# Patient Record
Sex: FEMALE | Race: WHITE | HISPANIC OR LATINO | Employment: UNEMPLOYED | URBAN - METROPOLITAN AREA
[De-identification: names, ages, dates, MRNs, and addresses within clinical notes are randomized per-mention and may not be internally consistent; named-entity substitution may affect disease eponyms.]

---

## 2019-02-27 ENCOUNTER — LAB VISIT (OUTPATIENT)
Dept: LAB | Facility: HOSPITAL | Age: 29
End: 2019-02-27
Attending: OBSTETRICS & GYNECOLOGY
Payer: MEDICAID

## 2019-02-27 ENCOUNTER — INITIAL PRENATAL (OUTPATIENT)
Dept: OBSTETRICS AND GYNECOLOGY | Facility: CLINIC | Age: 29
End: 2019-02-27
Payer: MEDICAID

## 2019-02-27 VITALS — DIASTOLIC BLOOD PRESSURE: 72 MMHG | SYSTOLIC BLOOD PRESSURE: 110 MMHG | WEIGHT: 132.19 LBS

## 2019-02-27 DIAGNOSIS — Z3A.36 36 WEEKS GESTATION OF PREGNANCY: ICD-10-CM

## 2019-02-27 DIAGNOSIS — Z36.3 ANTENATAL SCREENING FOR MALFORMATION USING ULTRASONICS: ICD-10-CM

## 2019-02-27 DIAGNOSIS — Z34.90 PREGNANCY, UNSPECIFIED GESTATIONAL AGE: ICD-10-CM

## 2019-02-27 DIAGNOSIS — O09.33 INSUFFICIENT PRENATAL CARE, THIRD TRIMESTER: ICD-10-CM

## 2019-02-27 DIAGNOSIS — Z34.90 PREGNANCY, UNSPECIFIED GESTATIONAL AGE: Primary | ICD-10-CM

## 2019-02-27 LAB
ABO + RH BLD: NORMAL
ALBUMIN SERPL BCP-MCNC: 2.4 G/DL
ALP SERPL-CCNC: 304 U/L
ALT SERPL W/O P-5'-P-CCNC: 16 U/L
ANION GAP SERPL CALC-SCNC: 7 MMOL/L
AST SERPL-CCNC: 18 U/L
BILIRUB SERPL-MCNC: 0.4 MG/DL
BLD GP AB SCN CELLS X3 SERPL QL: NORMAL
BUN SERPL-MCNC: 8 MG/DL
CALCIUM SERPL-MCNC: 9.1 MG/DL
CHLORIDE SERPL-SCNC: 110 MMOL/L
CO2 SERPL-SCNC: 21 MMOL/L
CREAT SERPL-MCNC: 0.6 MG/DL
ERYTHROCYTE [DISTWIDTH] IN BLOOD BY AUTOMATED COUNT: 13.2 %
EST. GFR  (AFRICAN AMERICAN): >60 ML/MIN/1.73 M^2
EST. GFR  (NON AFRICAN AMERICAN): >60 ML/MIN/1.73 M^2
GLUCOSE SERPL-MCNC: 86 MG/DL
HCT VFR BLD AUTO: 35.1 %
HGB BLD-MCNC: 11 G/DL
HGB S BLD QL SOLY: NEGATIVE
LDH SERPL L TO P-CCNC: 217 U/L
MCH RBC QN AUTO: 27.1 PG
MCHC RBC AUTO-ENTMCNC: 31.3 G/DL
MCV RBC AUTO: 87 FL
PLATELET # BLD AUTO: 191 K/UL
PMV BLD AUTO: 11.5 FL
POTASSIUM SERPL-SCNC: 3.8 MMOL/L
PROT SERPL-MCNC: 6.8 G/DL
RBC # BLD AUTO: 4.06 M/UL
SODIUM SERPL-SCNC: 138 MMOL/L
TSH SERPL DL<=0.005 MIU/L-ACNC: 1.83 UIU/ML
WBC # BLD AUTO: 4.15 K/UL

## 2019-02-27 PROCEDURE — 87088 URINE BACTERIA CULTURE: CPT | Mod: 59

## 2019-02-27 PROCEDURE — 87077 CULTURE AEROBIC IDENTIFY: CPT

## 2019-02-27 PROCEDURE — 83615 LACTATE (LD) (LDH) ENZYME: CPT

## 2019-02-27 PROCEDURE — 87081 CULTURE SCREEN ONLY: CPT

## 2019-02-27 PROCEDURE — 85027 COMPLETE CBC AUTOMATED: CPT

## 2019-02-27 PROCEDURE — 86703 HIV-1/HIV-2 1 RESULT ANTBDY: CPT

## 2019-02-27 PROCEDURE — 87340 HEPATITIS B SURFACE AG IA: CPT

## 2019-02-27 PROCEDURE — 99999 PR PBB SHADOW E&M-NEW PATIENT-LVL III: CPT | Mod: PBBFAC,,, | Performed by: OBSTETRICS & GYNECOLOGY

## 2019-02-27 PROCEDURE — 84443 ASSAY THYROID STIM HORMONE: CPT

## 2019-02-27 PROCEDURE — 87491 CHLMYD TRACH DNA AMP PROBE: CPT

## 2019-02-27 PROCEDURE — 86850 RBC ANTIBODY SCREEN: CPT

## 2019-02-27 PROCEDURE — 85660 RBC SICKLE CELL TEST: CPT

## 2019-02-27 PROCEDURE — 86762 RUBELLA ANTIBODY: CPT

## 2019-02-27 PROCEDURE — 99204 OFFICE O/P NEW MOD 45 MIN: CPT | Mod: TH,S$PBB,, | Performed by: OBSTETRICS & GYNECOLOGY

## 2019-02-27 PROCEDURE — 80053 COMPREHEN METABOLIC PANEL: CPT

## 2019-02-27 PROCEDURE — 99999 PR PBB SHADOW E&M-NEW PATIENT-LVL III: ICD-10-PCS | Mod: PBBFAC,,, | Performed by: OBSTETRICS & GYNECOLOGY

## 2019-02-27 PROCEDURE — 86592 SYPHILIS TEST NON-TREP QUAL: CPT

## 2019-02-27 PROCEDURE — 84156 ASSAY OF PROTEIN URINE: CPT

## 2019-02-27 PROCEDURE — 99204 PR OFFICE/OUTPT VISIT, NEW, LEVL IV, 45-59 MIN: ICD-10-PCS | Mod: TH,S$PBB,, | Performed by: OBSTETRICS & GYNECOLOGY

## 2019-02-27 PROCEDURE — 36415 COLL VENOUS BLD VENIPUNCTURE: CPT

## 2019-02-27 PROCEDURE — 99203 OFFICE O/P NEW LOW 30 MIN: CPT | Mod: PBBFAC,TH,PO | Performed by: OBSTETRICS & GYNECOLOGY

## 2019-02-27 PROCEDURE — 87086 URINE CULTURE/COLONY COUNT: CPT

## 2019-02-27 PROCEDURE — 87186 SC STD MICRODIL/AGAR DIL: CPT

## 2019-02-27 NOTE — PROGRESS NOTES
OBSTETRIC HISTORY:   OB History      Para Term  AB Living    3 1 1   1 1    SAB TAB Ectopic Multiple Live Births    1       1           HPI:   28 y.o.  Patient's last menstrual period was 2018.   Patient is  here for pregnancy without any prenatal care. She is 36 weeks by dates and appears to be advanced trimester. Complaints of headaches but relieved with Tylenol.  Feeling vaginal pressure and tingling in hands and feet. She denies bladder, breast and bowel complaints.    ROS:  GENERAL: Denies weight gain or weight loss. Feeling well overall.   SKIN: Denies rash or lesions.   HEAD: Denies headache.   NODES: Denies enlarged lymph nodes.   CHEST: Denies shortness of breath.   ABDOMEN: No abdominal pain, constipation, diarrhea, nausea, vomiting or rectal bleeding.   URINARY: No frequency, dysuria, hematuria, or burning on urination.  REPRODUCTIVE: See HPI.   BREASTS: The patient denies pain, lumps, or nipple discharge.   HEMATOLOGIC: No easy bruisability.   MUSCULOSKELETAL: Denies joint pain or back pain.   NEUROLOGIC: Denies weakness.   PSYCHIATRIC: Denies depression, anxiety or mood swings.    PE:   /72   Wt 59.9 kg (132 lb 2.7 oz)   LMP 2018   APPEARANCE: Well nourished, well developed, in no acute distress.  ABDOMEN: Soft. No tenderness or masses. No hernias.  PELVIC:   VULVA: No lesions. Normal female genitalia.  URETHRAL MEATUS: Normal size and location, no lesions, no prolapse.  URETHRA: No masses, tenderness, prolapse or scarring.  VAGINA: Moist and well rugated, no discharge, no significant cystocele or rectocele.  CERVIX: No lesions and discharge. 1cm/thick  UTERUS: Normal size, regular shape, mobile, non-tender, bladder base nontender.  ADNEXA: No masses or tenderness.  EXTREMITIES: no edema DTR's 1+    ASSESSMENT:  Pregnancy  Late prenatal care  History of pre-eclampsia    PLAN:  RTO 1 week  US as soon as possible  Consents today  Labs today  Can't give us urine yet but  will try to obtain urine for culture and p/c ratio  Unsure about circ

## 2019-02-27 NOTE — PROGRESS NOTES
Complaining of vaginal pressure and lower abdominal pain.  Also complaining headaches daily in the afternoon.  Denies blurred vision or right upper quadrant pain.  And complaining of tingling in hands and fingers    Patient is unable to give urine specimen

## 2019-02-28 ENCOUNTER — PROCEDURE VISIT (OUTPATIENT)
Dept: OBSTETRICS AND GYNECOLOGY | Facility: CLINIC | Age: 29
End: 2019-02-28
Payer: MEDICAID

## 2019-02-28 DIAGNOSIS — Z36.3 ANTENATAL SCREENING FOR MALFORMATION USING ULTRASONICS: ICD-10-CM

## 2019-02-28 DIAGNOSIS — O09.33 INSUFFICIENT PRENATAL CARE, THIRD TRIMESTER: ICD-10-CM

## 2019-02-28 DIAGNOSIS — Z3A.36 36 WEEKS GESTATION OF PREGNANCY: ICD-10-CM

## 2019-02-28 LAB
CREAT UR-MCNC: 71 MG/DL
HBV SURFACE AG SERPL QL IA: NEGATIVE
HIV 1+2 AB+HIV1 P24 AG SERPL QL IA: NEGATIVE
PROT UR-MCNC: 15 MG/DL
PROT/CREAT UR: 0.21 MG/G{CREAT}
RPR SER QL: NORMAL
RUBV IGG SER-ACNC: 76.8 IU/ML
RUBV IGG SER-IMP: REACTIVE

## 2019-02-28 PROCEDURE — 76805 PR US, OB 14+WKS, TRANSABD, SINGLE GESTATION: ICD-10-PCS | Mod: 26,S$PBB,, | Performed by: OBSTETRICS & GYNECOLOGY

## 2019-02-28 PROCEDURE — 76805 OB US >/= 14 WKS SNGL FETUS: CPT | Mod: 26,S$PBB,, | Performed by: OBSTETRICS & GYNECOLOGY

## 2019-02-28 PROCEDURE — 76805 OB US >/= 14 WKS SNGL FETUS: CPT | Mod: PBBFAC,PO | Performed by: OBSTETRICS & GYNECOLOGY

## 2019-03-01 LAB
C TRACH DNA SPEC QL NAA+PROBE: NOT DETECTED
N GONORRHOEA DNA SPEC QL NAA+PROBE: NOT DETECTED

## 2019-03-02 LAB
BACTERIA SPEC AEROBE CULT: NORMAL
BACTERIA UR CULT: NORMAL

## 2019-03-08 ENCOUNTER — HOSPITAL ENCOUNTER (OUTPATIENT)
Facility: HOSPITAL | Age: 29
Discharge: HOME | End: 2019-03-08
Attending: OBSTETRICS & GYNECOLOGY | Admitting: OBSTETRICS & GYNECOLOGY
Payer: MEDICAID

## 2019-03-08 ENCOUNTER — ROUTINE PRENATAL (OUTPATIENT)
Dept: OBSTETRICS AND GYNECOLOGY | Facility: CLINIC | Age: 29
End: 2019-03-08
Payer: MEDICAID

## 2019-03-08 VITALS — DIASTOLIC BLOOD PRESSURE: 74 MMHG | SYSTOLIC BLOOD PRESSURE: 118 MMHG | WEIGHT: 132.38 LBS

## 2019-03-08 VITALS
HEART RATE: 100 BPM | TEMPERATURE: 98 F | DIASTOLIC BLOOD PRESSURE: 84 MMHG | SYSTOLIC BLOOD PRESSURE: 115 MMHG | RESPIRATION RATE: 18 BRPM | OXYGEN SATURATION: 98 %

## 2019-03-08 DIAGNOSIS — O09.33 INSUFFICIENT PRENATAL CARE, THIRD TRIMESTER: ICD-10-CM

## 2019-03-08 DIAGNOSIS — Z3A.37 37 WEEKS GESTATION OF PREGNANCY: ICD-10-CM

## 2019-03-08 DIAGNOSIS — O12.13 PROTEINURIA AFFECTING PREGNANCY IN THIRD TRIMESTER: Primary | ICD-10-CM

## 2019-03-08 DIAGNOSIS — O12.13 PROTEINURIA AFFECTING PREGNANCY IN THIRD TRIMESTER: ICD-10-CM

## 2019-03-08 DIAGNOSIS — Z34.83 ENCOUNTER FOR SUPERVISION OF OTHER NORMAL PREGNANCY, THIRD TRIMESTER: Primary | ICD-10-CM

## 2019-03-08 LAB
ALBUMIN SERPL BCP-MCNC: 2.4 G/DL
ALP SERPL-CCNC: 298 U/L
ALT SERPL W/O P-5'-P-CCNC: 13 U/L
ANION GAP SERPL CALC-SCNC: 8 MMOL/L
AST SERPL-CCNC: 15 U/L
BASOPHILS # BLD AUTO: 0.01 K/UL
BASOPHILS NFR BLD: 0.3 %
BILIRUB SERPL-MCNC: 0.3 MG/DL
BUN SERPL-MCNC: 6 MG/DL
CALCIUM SERPL-MCNC: 8.9 MG/DL
CHLORIDE SERPL-SCNC: 110 MMOL/L
CO2 SERPL-SCNC: 21 MMOL/L
CREAT SERPL-MCNC: 0.7 MG/DL
CREAT UR-MCNC: 177.1 MG/DL
DIFFERENTIAL METHOD: ABNORMAL
EOSINOPHIL # BLD AUTO: 0 K/UL
EOSINOPHIL NFR BLD: 0.3 %
ERYTHROCYTE [DISTWIDTH] IN BLOOD BY AUTOMATED COUNT: 13.7 %
EST. GFR  (AFRICAN AMERICAN): >60 ML/MIN/1.73 M^2
EST. GFR  (NON AFRICAN AMERICAN): >60 ML/MIN/1.73 M^2
GLUCOSE SERPL-MCNC: 104 MG/DL
HCT VFR BLD AUTO: 34 %
HGB BLD-MCNC: 10.8 G/DL
LDH SERPL L TO P-CCNC: 196 U/L
LYMPHOCYTES # BLD AUTO: 1.4 K/UL
LYMPHOCYTES NFR BLD: 33.8 %
MCH RBC QN AUTO: 27.3 PG
MCHC RBC AUTO-ENTMCNC: 31.8 G/DL
MCV RBC AUTO: 86 FL
MONOCYTES # BLD AUTO: 0.4 K/UL
MONOCYTES NFR BLD: 10.3 %
NEUTROPHILS # BLD AUTO: 2.2 K/UL
NEUTROPHILS NFR BLD: 55.3 %
PLATELET # BLD AUTO: 196 K/UL
PMV BLD AUTO: 12.1 FL
POTASSIUM SERPL-SCNC: 4 MMOL/L
PROT SERPL-MCNC: 6.6 G/DL
PROT UR-MCNC: 40 MG/DL
PROT/CREAT UR: 0.23 MG/G{CREAT}
RBC # BLD AUTO: 3.96 M/UL
SODIUM SERPL-SCNC: 139 MMOL/L
URATE SERPL-MCNC: 5.6 MG/DL
WBC # BLD AUTO: 3.99 K/UL

## 2019-03-08 PROCEDURE — 84156 ASSAY OF PROTEIN URINE: CPT

## 2019-03-08 PROCEDURE — 99213 PR OFFICE/OUTPT VISIT, EST, LEVL III, 20-29 MIN: ICD-10-PCS | Mod: TH,S$PBB,, | Performed by: OBSTETRICS & GYNECOLOGY

## 2019-03-08 PROCEDURE — 99999 PR PBB SHADOW E&M-EST. PATIENT-LVL II: ICD-10-PCS | Mod: PBBFAC,,, | Performed by: OBSTETRICS & GYNECOLOGY

## 2019-03-08 PROCEDURE — 85025 COMPLETE CBC W/AUTO DIFF WBC: CPT

## 2019-03-08 PROCEDURE — 80053 COMPREHEN METABOLIC PANEL: CPT

## 2019-03-08 PROCEDURE — 99213 OFFICE O/P EST LOW 20 MIN: CPT | Mod: TH,S$PBB,, | Performed by: OBSTETRICS & GYNECOLOGY

## 2019-03-08 PROCEDURE — 99212 OFFICE O/P EST SF 10 MIN: CPT | Mod: PBBFAC,TH,PO | Performed by: OBSTETRICS & GYNECOLOGY

## 2019-03-08 PROCEDURE — 99211 OFF/OP EST MAY X REQ PHY/QHP: CPT | Mod: 27

## 2019-03-08 PROCEDURE — 99999 PR PBB SHADOW E&M-EST. PATIENT-LVL II: CPT | Mod: PBBFAC,,, | Performed by: OBSTETRICS & GYNECOLOGY

## 2019-03-08 PROCEDURE — 59025 FETAL NON-STRESS TEST: CPT

## 2019-03-08 PROCEDURE — 83615 LACTATE (LD) (LDH) ENZYME: CPT

## 2019-03-08 PROCEDURE — 36415 COLL VENOUS BLD VENIPUNCTURE: CPT

## 2019-03-08 PROCEDURE — 84550 ASSAY OF BLOOD/URIC ACID: CPT

## 2019-03-08 NOTE — PROGRESS NOTES
Headaches (not today) but go away with Tylenol. No visual changes or RUQ pain. Given pre-eclampsia precautions if goes home.

## 2019-03-08 NOTE — PROGRESS NOTES
Pt to unit from clinic for pre-e workup. Marttii in room for translation.  Pt states has a moderate headache with no visual disturbances. Denies RUQ pain. Some swelling to bilateral feet but no pitting.  Initial BP stable.  Care plan explained to patient with positive understanding.  States no questions at this time. Lab at bedside and urine sent.  Will continue to monitor.   12:22pm Dr. Peter on phone.  Aware of lab results, EFM tracing and serial bp. States pt to be discharged home with instructions on 24hr urine collection. Instructed to verify telephone number of pt in case she needs to be contacted.    1244: Pt discharged to home.  Marttii used for translation.  24 hr urine collection explained in detail.  Pt verbalized understanding regarding proper collection, storage, and return.  Taken off EFM and given AVS

## 2019-03-09 PROBLEM — Z3A.37 37 WEEKS GESTATION OF PREGNANCY: Status: ACTIVE | Noted: 2019-03-09

## 2019-03-10 ENCOUNTER — TELEPHONE (OUTPATIENT)
Dept: OBSTETRICS AND GYNECOLOGY | Facility: CLINIC | Age: 29
End: 2019-03-10

## 2019-03-11 NOTE — TELEPHONE ENCOUNTER
Called and informed patient (Notify protein was not elevated enough for pre-eclampsia diagnosis) patient verbalized understanding.

## 2019-03-15 ENCOUNTER — ROUTINE PRENATAL (OUTPATIENT)
Dept: OBSTETRICS AND GYNECOLOGY | Facility: CLINIC | Age: 29
End: 2019-03-15
Payer: MEDICAID

## 2019-03-15 VITALS — WEIGHT: 134.38 LBS | SYSTOLIC BLOOD PRESSURE: 120 MMHG | DIASTOLIC BLOOD PRESSURE: 80 MMHG

## 2019-03-15 DIAGNOSIS — Z3A.38 38 WEEKS GESTATION OF PREGNANCY: ICD-10-CM

## 2019-03-15 DIAGNOSIS — Z34.83 ENCOUNTER FOR SUPERVISION OF OTHER NORMAL PREGNANCY, THIRD TRIMESTER: Primary | ICD-10-CM

## 2019-03-15 PROCEDURE — 99213 OFFICE O/P EST LOW 20 MIN: CPT | Mod: TH,S$PBB,, | Performed by: OBSTETRICS & GYNECOLOGY

## 2019-03-15 PROCEDURE — 99212 OFFICE O/P EST SF 10 MIN: CPT | Mod: PBBFAC,TH,PO | Performed by: OBSTETRICS & GYNECOLOGY

## 2019-03-15 PROCEDURE — 99999 PR PBB SHADOW E&M-EST. PATIENT-LVL II: ICD-10-PCS | Mod: PBBFAC,,, | Performed by: OBSTETRICS & GYNECOLOGY

## 2019-03-15 PROCEDURE — 99999 PR PBB SHADOW E&M-EST. PATIENT-LVL II: CPT | Mod: PBBFAC,,, | Performed by: OBSTETRICS & GYNECOLOGY

## 2019-03-15 PROCEDURE — 99213 PR OFFICE/OUTPT VISIT, EST, LEVL III, 20-29 MIN: ICD-10-PCS | Mod: TH,S$PBB,, | Performed by: OBSTETRICS & GYNECOLOGY

## 2019-03-15 NOTE — PROGRESS NOTES
Contractions and coughing with sore throat. Can take Mucinex DM and Claritin. Pitocin induction scheduled 3/21 at 6am

## 2019-03-17 DIAGNOSIS — Z34.83 ENCOUNTER FOR SUPERVISION OF OTHER NORMAL PREGNANCY IN THIRD TRIMESTER: ICD-10-CM

## 2019-03-17 RX ORDER — SODIUM CHLORIDE, SODIUM LACTATE, POTASSIUM CHLORIDE, CALCIUM CHLORIDE 600; 310; 30; 20 MG/100ML; MG/100ML; MG/100ML; MG/100ML
INJECTION, SOLUTION INTRAVENOUS CONTINUOUS
Status: CANCELLED | OUTPATIENT
Start: 2019-03-17

## 2019-03-17 RX ORDER — BUTORPHANOL TARTRATE 1 MG/ML
1 INJECTION INTRAMUSCULAR; INTRAVENOUS
Status: CANCELLED | OUTPATIENT
Start: 2019-03-17

## 2019-03-17 RX ORDER — ONDANSETRON 4 MG/1
8 TABLET, ORALLY DISINTEGRATING ORAL EVERY 8 HOURS PRN
Status: CANCELLED | OUTPATIENT
Start: 2019-03-17

## 2019-03-17 RX ORDER — BUTORPHANOL TARTRATE 1 MG/ML
2 INJECTION INTRAMUSCULAR; INTRAVENOUS
Status: CANCELLED | OUTPATIENT
Start: 2019-03-17

## 2019-03-17 RX ORDER — MISOPROSTOL 100 UG/1
600 TABLET ORAL
Status: CANCELLED | OUTPATIENT
Start: 2019-03-17

## 2019-03-21 ENCOUNTER — HOSPITAL ENCOUNTER (INPATIENT)
Facility: HOSPITAL | Age: 29
LOS: 2 days | Discharge: HOME | End: 2019-03-23
Attending: OBSTETRICS & GYNECOLOGY | Admitting: OBSTETRICS & GYNECOLOGY
Payer: MEDICAID

## 2019-03-21 ENCOUNTER — ANESTHESIA (OUTPATIENT)
Dept: OBSTETRICS AND GYNECOLOGY | Facility: HOSPITAL | Age: 29
End: 2019-03-21
Payer: MEDICAID

## 2019-03-21 ENCOUNTER — ANESTHESIA EVENT (OUTPATIENT)
Dept: OBSTETRICS AND GYNECOLOGY | Facility: HOSPITAL | Age: 29
End: 2019-03-21
Payer: MEDICAID

## 2019-03-21 DIAGNOSIS — Z34.83 ENCOUNTER FOR SUPERVISION OF OTHER NORMAL PREGNANCY IN THIRD TRIMESTER: ICD-10-CM

## 2019-03-21 PROBLEM — Z34.90 SUPERVISION OF NORMAL PREGNANCY: Status: ACTIVE | Noted: 2019-03-21

## 2019-03-21 LAB
ABO + RH BLD: NORMAL
ALBUMIN SERPL BCP-MCNC: 2.3 G/DL
ALP SERPL-CCNC: 286 U/L
ALT SERPL W/O P-5'-P-CCNC: 5 U/L
ANION GAP SERPL CALC-SCNC: 7 MMOL/L
AST SERPL-CCNC: 13 U/L
BASOPHILS # BLD AUTO: 0.01 K/UL
BASOPHILS NFR BLD: 0.2 %
BILIRUB SERPL-MCNC: 0.3 MG/DL
BLD GP AB SCN CELLS X3 SERPL QL: NORMAL
BUN SERPL-MCNC: 6 MG/DL
CALCIUM SERPL-MCNC: 8.9 MG/DL
CHLORIDE SERPL-SCNC: 109 MMOL/L
CO2 SERPL-SCNC: 22 MMOL/L
CREAT SERPL-MCNC: 0.7 MG/DL
CREAT UR-MCNC: 73.8 MG/DL
DIFFERENTIAL METHOD: ABNORMAL
EOSINOPHIL # BLD AUTO: 0 K/UL
EOSINOPHIL NFR BLD: 0.7 %
ERYTHROCYTE [DISTWIDTH] IN BLOOD BY AUTOMATED COUNT: 14.1 %
EST. GFR  (AFRICAN AMERICAN): >60 ML/MIN/1.73 M^2
EST. GFR  (NON AFRICAN AMERICAN): >60 ML/MIN/1.73 M^2
GLUCOSE SERPL-MCNC: 70 MG/DL
HCT VFR BLD AUTO: 33.6 %
HGB BLD-MCNC: 10.3 G/DL
LDH SERPL L TO P-CCNC: 221 U/L
LYMPHOCYTES # BLD AUTO: 1.6 K/UL
LYMPHOCYTES NFR BLD: 39.6 %
MCH RBC QN AUTO: 26.1 PG
MCHC RBC AUTO-ENTMCNC: 30.7 G/DL
MCV RBC AUTO: 85 FL
MONOCYTES # BLD AUTO: 0.4 K/UL
MONOCYTES NFR BLD: 10.6 %
NEUTROPHILS # BLD AUTO: 2 K/UL
NEUTROPHILS NFR BLD: 48.7 %
PLATELET # BLD AUTO: 183 K/UL
PMV BLD AUTO: 11.4 FL
POTASSIUM SERPL-SCNC: 4.5 MMOL/L
PROT SERPL-MCNC: 6 G/DL
PROT UR-MCNC: 20 MG/DL
PROT/CREAT UR: 0.27 MG/G{CREAT}
RBC # BLD AUTO: 3.94 M/UL
SODIUM SERPL-SCNC: 138 MMOL/L
URATE SERPL-MCNC: 5.9 MG/DL
WBC # BLD AUTO: 4.04 K/UL

## 2019-03-21 PROCEDURE — 63600175 PHARM REV CODE 636 W HCPCS: Performed by: OBSTETRICS & GYNECOLOGY

## 2019-03-21 PROCEDURE — 85025 COMPLETE CBC W/AUTO DIFF WBC: CPT

## 2019-03-21 PROCEDURE — 72200005 HC VAGINAL DELIVERY LEVEL II

## 2019-03-21 PROCEDURE — 51702 INSERT TEMP BLADDER CATH: CPT

## 2019-03-21 PROCEDURE — 27200710 HC EPIDURAL INFUSION PUMP SET: Performed by: ANESTHESIOLOGY

## 2019-03-21 PROCEDURE — 59409 PR OBSTETRICAL CARE,VAG DELIV ONLY: ICD-10-PCS | Mod: GB,,, | Performed by: OBSTETRICS & GYNECOLOGY

## 2019-03-21 PROCEDURE — 86850 RBC ANTIBODY SCREEN: CPT

## 2019-03-21 PROCEDURE — 72100002 HC LABOR CARE, 1ST 8 HOURS

## 2019-03-21 PROCEDURE — 59409 OBSTETRICAL CARE: CPT | Mod: GB,,, | Performed by: OBSTETRICS & GYNECOLOGY

## 2019-03-21 PROCEDURE — 27800516 HC TRAY, EPIDURAL COMBO: Performed by: ANESTHESIOLOGY

## 2019-03-21 PROCEDURE — 25000003 PHARM REV CODE 250: Performed by: OBSTETRICS & GYNECOLOGY

## 2019-03-21 PROCEDURE — 36415 COLL VENOUS BLD VENIPUNCTURE: CPT

## 2019-03-21 PROCEDURE — 63600175 PHARM REV CODE 636 W HCPCS: Performed by: ANESTHESIOLOGY

## 2019-03-21 PROCEDURE — 86592 SYPHILIS TEST NON-TREP QUAL: CPT

## 2019-03-21 PROCEDURE — 84550 ASSAY OF BLOOD/URIC ACID: CPT

## 2019-03-21 PROCEDURE — 62326 NJX INTERLAMINAR LMBR/SAC: CPT | Performed by: ANESTHESIOLOGY

## 2019-03-21 PROCEDURE — 11000001 HC ACUTE MED/SURG PRIVATE ROOM

## 2019-03-21 PROCEDURE — 80053 COMPREHEN METABOLIC PANEL: CPT

## 2019-03-21 PROCEDURE — 59025 FETAL NON-STRESS TEST: CPT

## 2019-03-21 PROCEDURE — 84156 ASSAY OF PROTEIN URINE: CPT

## 2019-03-21 PROCEDURE — 83615 LACTATE (LD) (LDH) ENZYME: CPT

## 2019-03-21 RX ORDER — DOCUSATE SODIUM 100 MG/1
200 CAPSULE, LIQUID FILLED ORAL 2 TIMES DAILY PRN
Status: DISCONTINUED | OUTPATIENT
Start: 2019-03-21 | End: 2019-03-23 | Stop reason: HOSPADM

## 2019-03-21 RX ORDER — BUTORPHANOL TARTRATE 2 MG/ML
2 INJECTION INTRAMUSCULAR; INTRAVENOUS
Status: DISCONTINUED | OUTPATIENT
Start: 2019-03-21 | End: 2019-03-21

## 2019-03-21 RX ORDER — MISOPROSTOL 200 UG/1
600 TABLET ORAL
Status: DISCONTINUED | OUTPATIENT
Start: 2019-03-21 | End: 2019-03-23 | Stop reason: HOSPADM

## 2019-03-21 RX ORDER — HYDRALAZINE HYDROCHLORIDE 20 MG/ML
5 INJECTION INTRAMUSCULAR; INTRAVENOUS ONCE AS NEEDED
Status: DISCONTINUED | OUTPATIENT
Start: 2019-03-21 | End: 2019-03-23 | Stop reason: HOSPADM

## 2019-03-21 RX ORDER — IBUPROFEN 600 MG/1
600 TABLET ORAL EVERY 6 HOURS
Status: DISCONTINUED | OUTPATIENT
Start: 2019-03-21 | End: 2019-03-23 | Stop reason: HOSPADM

## 2019-03-21 RX ORDER — SIMETHICONE 80 MG
1 TABLET,CHEWABLE ORAL EVERY 6 HOURS PRN
Status: DISCONTINUED | OUTPATIENT
Start: 2019-03-21 | End: 2019-03-23 | Stop reason: HOSPADM

## 2019-03-21 RX ORDER — OXYTOCIN/RINGER'S LACTATE 20/1000 ML
333 PLASTIC BAG, INJECTION (ML) INTRAVENOUS CONTINUOUS
Status: DISCONTINUED | OUTPATIENT
Start: 2019-03-21 | End: 2019-03-21

## 2019-03-21 RX ORDER — FAMOTIDINE 10 MG/ML
20 INJECTION INTRAVENOUS ONCE
Status: DISCONTINUED | OUTPATIENT
Start: 2019-03-21 | End: 2019-03-21

## 2019-03-21 RX ORDER — BUTORPHANOL TARTRATE 2 MG/ML
1 INJECTION INTRAMUSCULAR; INTRAVENOUS
Status: DISCONTINUED | OUTPATIENT
Start: 2019-03-21 | End: 2019-03-21

## 2019-03-21 RX ORDER — OXYCODONE AND ACETAMINOPHEN 5; 325 MG/1; MG/1
1 TABLET ORAL EVERY 4 HOURS PRN
Status: DISCONTINUED | OUTPATIENT
Start: 2019-03-21 | End: 2019-03-23 | Stop reason: HOSPADM

## 2019-03-21 RX ORDER — OXYCODONE AND ACETAMINOPHEN 10; 325 MG/1; MG/1
1 TABLET ORAL EVERY 4 HOURS PRN
Status: DISCONTINUED | OUTPATIENT
Start: 2019-03-21 | End: 2019-03-23 | Stop reason: HOSPADM

## 2019-03-21 RX ORDER — SODIUM CHLORIDE, SODIUM LACTATE, POTASSIUM CHLORIDE, CALCIUM CHLORIDE 600; 310; 30; 20 MG/100ML; MG/100ML; MG/100ML; MG/100ML
INJECTION, SOLUTION INTRAVENOUS CONTINUOUS
Status: DISCONTINUED | OUTPATIENT
Start: 2019-03-21 | End: 2019-03-21

## 2019-03-21 RX ORDER — DIPHENHYDRAMINE HCL 25 MG
25 CAPSULE ORAL EVERY 4 HOURS PRN
Status: DISCONTINUED | OUTPATIENT
Start: 2019-03-21 | End: 2019-03-23 | Stop reason: HOSPADM

## 2019-03-21 RX ORDER — IBUPROFEN 600 MG/1
600 TABLET ORAL EVERY 6 HOURS
Qty: 40 TABLET | Refills: 0 | Status: SHIPPED | OUTPATIENT
Start: 2019-03-22 | End: 2020-09-04

## 2019-03-21 RX ORDER — OXYTOCIN/RINGER'S LACTATE 20/1000 ML
41.7 PLASTIC BAG, INJECTION (ML) INTRAVENOUS CONTINUOUS
Status: DISCONTINUED | OUTPATIENT
Start: 2019-03-21 | End: 2019-03-21

## 2019-03-21 RX ORDER — HYDRALAZINE HYDROCHLORIDE 20 MG/ML
10 INJECTION INTRAMUSCULAR; INTRAVENOUS ONCE AS NEEDED
Status: DISCONTINUED | OUTPATIENT
Start: 2019-03-21 | End: 2019-03-21

## 2019-03-21 RX ORDER — ROPIVACAINE HYDROCHLORIDE 2 MG/ML
INJECTION, SOLUTION EPIDURAL; INFILTRATION; PERINEURAL CONTINUOUS PRN
Status: DISCONTINUED | OUTPATIENT
Start: 2019-03-21 | End: 2019-03-21

## 2019-03-21 RX ORDER — IBUPROFEN 600 MG/1
600 TABLET ORAL EVERY 6 HOURS
Status: DISCONTINUED | OUTPATIENT
Start: 2019-03-21 | End: 2019-03-21

## 2019-03-21 RX ORDER — DIPHENHYDRAMINE HYDROCHLORIDE 50 MG/ML
12.5 INJECTION INTRAMUSCULAR; INTRAVENOUS EVERY 4 HOURS PRN
Status: DISCONTINUED | OUTPATIENT
Start: 2019-03-21 | End: 2019-03-21

## 2019-03-21 RX ORDER — OXYTOCIN/RINGER'S LACTATE 20/1000 ML
41.65 PLASTIC BAG, INJECTION (ML) INTRAVENOUS CONTINUOUS
Status: ACTIVE | OUTPATIENT
Start: 2019-03-21 | End: 2019-03-21

## 2019-03-21 RX ORDER — FENTANYL CITRATE 50 UG/ML
INJECTION, SOLUTION INTRAMUSCULAR; INTRAVENOUS
Status: DISPENSED
Start: 2019-03-21 | End: 2019-03-21

## 2019-03-21 RX ORDER — OXYTOCIN/RINGER'S LACTATE 20/1000 ML
2 PLASTIC BAG, INJECTION (ML) INTRAVENOUS CONTINUOUS
Status: DISCONTINUED | OUTPATIENT
Start: 2019-03-21 | End: 2019-03-21

## 2019-03-21 RX ORDER — ONDANSETRON 8 MG/1
8 TABLET, ORALLY DISINTEGRATING ORAL EVERY 8 HOURS PRN
Status: DISCONTINUED | OUTPATIENT
Start: 2019-03-21 | End: 2019-03-21

## 2019-03-21 RX ORDER — MISOPROSTOL 200 UG/1
200 TABLET ORAL EVERY 4 HOURS PRN
Status: ACTIVE | OUTPATIENT
Start: 2019-03-21 | End: 2019-03-22

## 2019-03-21 RX ORDER — OXYCODONE AND ACETAMINOPHEN 5; 325 MG/1; MG/1
1 TABLET ORAL EVERY 4 HOURS PRN
Qty: 10 TABLET | Refills: 0 | Status: SHIPPED | OUTPATIENT
Start: 2019-03-21 | End: 2020-09-29

## 2019-03-21 RX ORDER — NALOXONE HCL 0.4 MG/ML
0.02 VIAL (ML) INJECTION
Status: DISCONTINUED | OUTPATIENT
Start: 2019-03-21 | End: 2019-03-21

## 2019-03-21 RX ORDER — HYDROCORTISONE 25 MG/G
CREAM TOPICAL 3 TIMES DAILY PRN
Status: DISCONTINUED | OUTPATIENT
Start: 2019-03-21 | End: 2019-03-23 | Stop reason: HOSPADM

## 2019-03-21 RX ORDER — METOCLOPRAMIDE HYDROCHLORIDE 5 MG/ML
10 INJECTION INTRAMUSCULAR; INTRAVENOUS ONCE
Status: DISCONTINUED | OUTPATIENT
Start: 2019-03-21 | End: 2019-03-21

## 2019-03-21 RX ORDER — ROPIVACAINE HYDROCHLORIDE 2 MG/ML
INJECTION, SOLUTION EPIDURAL; INFILTRATION; PERINEURAL
Status: DISCONTINUED | OUTPATIENT
Start: 2019-03-21 | End: 2019-03-21

## 2019-03-21 RX ORDER — SODIUM CITRATE AND CITRIC ACID MONOHYDRATE 334; 500 MG/5ML; MG/5ML
30 SOLUTION ORAL ONCE
Status: DISCONTINUED | OUTPATIENT
Start: 2019-03-21 | End: 2019-03-21

## 2019-03-21 RX ORDER — ONDANSETRON 8 MG/1
8 TABLET, ORALLY DISINTEGRATING ORAL EVERY 8 HOURS PRN
Status: DISCONTINUED | OUTPATIENT
Start: 2019-03-21 | End: 2019-03-23 | Stop reason: HOSPADM

## 2019-03-21 RX ORDER — ROPIVACAINE HYDROCHLORIDE 2 MG/ML
INJECTION, SOLUTION EPIDURAL; INFILTRATION; PERINEURAL
Status: COMPLETED
Start: 2019-03-21 | End: 2019-03-21

## 2019-03-21 RX ADMIN — SODIUM CHLORIDE, SODIUM LACTATE, POTASSIUM CHLORIDE, AND CALCIUM CHLORIDE: .6; .31; .03; .02 INJECTION, SOLUTION INTRAVENOUS at 07:03

## 2019-03-21 RX ADMIN — BENZOCAINE AND LEVOMENTHOL: 200; 5 SPRAY TOPICAL at 02:03

## 2019-03-21 RX ADMIN — ROPIVACAINE HYDROCHLORIDE 1.6 ML: 2 INJECTION, SOLUTION EPIDURAL; INFILTRATION at 09:03

## 2019-03-21 RX ADMIN — SODIUM CHLORIDE, SODIUM LACTATE, POTASSIUM CHLORIDE, AND CALCIUM CHLORIDE: .6; .31; .03; .02 INJECTION, SOLUTION INTRAVENOUS at 08:03

## 2019-03-21 RX ADMIN — OXYCODONE HYDROCHLORIDE AND ACETAMINOPHEN 1 TABLET: 5; 325 TABLET ORAL at 03:03

## 2019-03-21 RX ADMIN — ROPIVACAINE HYDROCHLORIDE 10 ML/HR: 2 INJECTION, SOLUTION EPIDURAL; INFILTRATION at 09:03

## 2019-03-21 RX ADMIN — IBUPROFEN 600 MG: 600 TABLET, FILM COATED ORAL at 02:03

## 2019-03-21 RX ADMIN — Medication 333 MILLI-UNITS/MIN: at 12:03

## 2019-03-21 NOTE — PLAN OF CARE
Problem: Bleeding (Postpartum Vaginal Delivery)  Goal: Hemostasis  Outcome: Ongoing (interventions implemented as appropriate)  Fundal massage performed and bleeding assessed every 15 minutes x 1 hour, then every 30 minutes x 2 hours.

## 2019-03-21 NOTE — H&P
HPI:   Saadia Gayle is a 28 y.o. female  at 39 weeks 2 days EGA who presents here for elective induction. Her prenatal care was complicated by late prenatal care.    ROS:  GENERAL: Denies weight gain or weight loss. Feeling well overall.   SKIN: Denies rash or lesions.   HEAD: Denies head injury or headache.   CHEST: Denies chest pain or shortness of breath.   CARDIOVASCULAR: Denies palpitations or left sided chest pain.   ABDOMEN: No constipation, diarrhea, nausea, vomiting or rectal bleeding.   URINARY: No frequency, dysuria, hematuria, or burning on urination.  REPRODUCTIVE: See HPI.     Past Medical History:   Diagnosis Date    Hypertension in pregnancy      Past Surgical History:   Procedure Laterality Date    DILATION AND CURETTAGE OF UTERUS  2017    SAB     Family History   Problem Relation Age of Onset    Hypertension Father 53    Breast cancer Neg Hx     Colon cancer Neg Hx     Ovarian cancer Neg Hx      Penicillins       PE:   LMP 2018   APPEARANCE: Well nourished, well developed, in no acute distress.  CHEST: Lungs clear to auscultation.  HEART: Regular rate and rhythm, no murmurs, rubs or gallops.  ABDOMEN:  Gravid. NTND soft       Assessment:  IUP 39 weeks 2 days  Late prenatal care  Category 1 Fetal Heart Tracing    Plan:   Expect epidural soon

## 2019-03-21 NOTE — PLAN OF CARE
Problem: Adult Inpatient Plan of Care  Goal: Plan of Care Review  Outcome: Ongoing (interventions implemented as appropriate)  POC reviewed with pt and SO. Verbalized understanding. Questions encouraged and answered.

## 2019-03-21 NOTE — ANESTHESIA PREPROCEDURE EVALUATION
2019  Saadia Gayle is a 28 y.o., female  at 39 weeks 2 days EGA here for elective induction.Prenatal care was complicated by late prenatal care. Requesting labor analgesia.    Past Medical History:   Diagnosis Date    Hypertension in pregnancy      Past Surgical History:   Procedure Laterality Date    DILATION AND CURETTAGE OF UTERUS  2017    SAB     Recent Labs   Lab 19  0649   WBC 4.04   RBC 3.94*   HGB 10.3*   HCT 33.6*      MCV 85   MCH 26.1*   MCHC 30.7*     No current facility-administered medications on file prior to encounter.      Current Outpatient Medications on File Prior to Encounter   Medication Sig Dispense Refill    prenatal vit calc,iron,folic (PRENATAL VITAMIN ORAL) Take by mouth.       .    Anesthesia Evaluation    I have reviewed the Patient Summary Reports.    I have reviewed the Nursing Notes.   I have reviewed the Medications.     Review of Systems  Anesthesia Hx:  No problems with previous Anesthesia  History of prior surgery of interest to airway management or planning: Denies Family Hx of Anesthesia complications.   Denies Personal Hx of Anesthesia complications.   Social:  Non-Smoker    Hematology/Oncology:         -- Anemia:   EENT/Dental:EENT/Dental Normal   Cardiovascular:  Cardiovascular Normal Exercise tolerance: good     Pulmonary:  Pulmonary Normal    Renal/:  Renal/ Normal     Hepatic/GI:  Hepatic/GI Normal    Musculoskeletal:  Musculoskeletal Normal    OB/GYN/PEDS:  HTN with prev pregnancy    Neurological:  Neurology Normal    Endocrine:  Endocrine Normal    Dermatological:  Skin Normal    Psych:  Psychiatric Normal           Physical Exam  General:  Well nourished    Airway/Jaw/Neck:  Airway Findings: Mouth Opening: Small, but > 3cm Tongue: Normal  Mallampati: III  Improves to III with phonation.  TM Distance: 4 - 6 cm  Jaw/Neck  Findings:  Neck ROM: Normal ROM      Dental:  Dental Findings: In tact   Chest/Lungs:  Chest/Lungs Findings: Clear to auscultation     Heart/Vascular:  Heart Findings: Rate: Normal  Rhythm: Regular Rhythm        Mental Status:  Mental Status Findings:  Alert and Oriented, Cooperative         Anesthesia Plan  Type of Anesthesia, risks & benefits discussed:  Anesthesia Type:  CSE, general, MAC  Patient's Preference:   Intra-op Monitoring Plan: standard ASA monitors  Intra-op Monitoring Plan Comments:   Post Op Pain Control Plan:   Post Op Pain Control Plan Comments:   Induction:    Beta Blocker:  Patient is not currently on a Beta-Blocker (No further documentation required).       Informed Consent: Patient understands risks and agrees with Anesthesia plan.  Questions answered. Anesthesia consent signed with patient.  ASA Score: 2     Day of Surgery Review of History & Physical: I have interviewed and examined the patient. I have reviewed the patient's H&P dated:  There are no significant changes.   H&P completed by Anesthesiologist.       Ready For Surgery From Anesthesia Perspective.

## 2019-03-21 NOTE — ANESTHESIA PROCEDURE NOTES
CSE    Patient location during procedure: OB  Start time: 3/21/2019 9:32 AM  Timeout: 3/21/2019 9:31 AM  End time: 3/21/2019 9:42 AM  Staffing  Resident/CRNA: Patria Painter MD  Performed: resident/CRNA   Preanesthetic Checklist  Completed: patient identified, site marked, surgical consent, pre-op evaluation, timeout performed, IV checked, risks and benefits discussed and monitors and equipment checked  CSE  Patient position: sitting  Prep: ChloraPrep  Patient monitoring: heart rate, cardiac monitor, continuous pulse ox and frequent blood pressure checks  Approach: midline  Spinal Needle  Needle type: pencil-tip   Needle gauge: 22 G  Needle length: 5 in  Epidural Needle  Injection technique: GINGER saline  Needle type: Tuohy   Needle gauge: 17 G  Needle length: 3.5 in  Needle insertion depth: 4.5 cm  Location: L3-4  Catheter  Catheter type: [employer]  Catheter size: 19 G  Catheter at skin depth: 11 cm  Test dose: lidocaine 1.5% with Epi 1-to-200,000  Additional Documentation: incremental injection, negative aspiration for CSF and negative aspiration for heme  Assessment  Sensory level: T7   Dermatomal levels determined by alcohol swab

## 2019-03-21 NOTE — L&D DELIVERY NOTE
[facility]  Vaginal Delivery   Operative Note    SUMMARY   Date of Surgery: 3/21/19    Pre-Operative Diagnosis:   Term pregnancy       Post-Operative Diagnosis:   Same   Viable female infant  Shoulder dystocia  Nuchal cord x 1    Surgery:     Surgeon: MD Vi    Anesthesia: Epidural    EBL: 100 cc    Findings:   Viable female infant with 8/9 Apgars  Placenta delivered spontaneous intact  Position: Vertex, DANA  Shoulder dystocia noted immediately with [facility] as first maneuver followed by attempt at delivery of posterior shoulder and woodscrews then suprapubic without release then head of head down then delivery of posterior arm accomplished release of dystocia. Baby moves all extremities well and no crepitance noted per nursery nurse.     Episiotomy:   None    Lacerations:   Second degree ML laceration repaired in layers with 3-0 Chromic suture    Specimens: None    Complications:   Shoulder dystocia  Nuchal cord x 1      Normal spontaneous vaginal delivery of live infant, was placed on mothers abdomen for skin to skin and bulb suctioning performed.  Infant delivered position DANA over intact perineum.  Nuchal cord: Yes, cord reduced following delivery.    Spontaneous delivery of placenta and IV pitocin given noting good uterine tone.  2nd degree laceration noted and repaired in normal fashion with 3-0 chromic suture in layers.  Patient tolerated delivery well. Sponge needle and lap counted correctly x2.    Indications: <principal problem not specified>  Pregnancy complicated by:   Patient Active Problem List   Diagnosis    Proteinuria affecting pregnancy in third trimester    37 weeks gestation of pregnancy    Supervision of normal pregnancy     Admitting GA: 39w2d    Delivery Information for  Girl Saadia Gayle    Birth information:  YOB: 2017   Time of birth: 12:03 PM   Sex: female   Head Delivery Date/Time:     Delivery type:    Gestational Age:  39w2d    Delivery Providers    Delivering clinician:             Measurements    Weight:    Length:           Apgars    Living status:    Apgars:   1 min.:   5 min.:   10 min.:   15 min.:   20 min.:     Skin color:          Heart rate:          Reflex irritability:          Muscle tone:          Respiratory effort:          Total:                                 Interventions/Resuscitation         Cord    No data filed        Placenta    Placenta delivery date/time:    Placenta removal:             Labor Events:       labor:       Labor Onset Date/Time:         Dilation Complete Date/Time:         Start Pushing Date/Time:       Rupture Date/Time: 19  0742         Rupture type:           Fluid Amount:        Fluid Color:        Fluid Odor:        Membrane Status (PeriCalm): ARM (Artificial Rupture)      Rupture Date/Time (PeriCalm): 2019 07:42:00      Fluid Amount (PeriCalm): Small      Fluid Color (PeriCalm): Clear       steroids:       Antibiotics given for GBS:       Induction:       Indications for induction:        Augmentation:       Indications for augmentation:       Labor complications:       Additional complications:          Cervical ripening:                     Delivery:      Episiotomy:       Indication for Episiotomy:       Perineal Lacerations:   Repaired:      Periurethral Laceration:   Repaired:     Labial Laceration:   Repaired:     Sulcus Laceration:   Repaired:     Vaginal Laceration:   Repaired:     Cervical Laceration:   Repaired:     Repair suture:       Repair # of packets:       Vaginal delivery QBL (mL):        QBL (mL): 0     Combined Blood Loss (mL): 0     Vaginal Sweep Performed:       Surgicount Correct:         Other providers:            Details (if applicable):  Trial of Labor      Categorization:      Priority:     Indications for :     Incision Type:       Additional  information:  Forceps:    Vacuum:     Breech:    Observed anomalies    Other (Comments):

## 2019-03-22 LAB
BASOPHILS # BLD AUTO: 0 K/UL
BASOPHILS NFR BLD: 0 %
DIFFERENTIAL METHOD: ABNORMAL
EOSINOPHIL # BLD AUTO: 0 K/UL
EOSINOPHIL NFR BLD: 0.3 %
ERYTHROCYTE [DISTWIDTH] IN BLOOD BY AUTOMATED COUNT: 14.3 %
HCT VFR BLD AUTO: 30.2 %
HGB BLD-MCNC: 9.4 G/DL
LYMPHOCYTES # BLD AUTO: 1.4 K/UL
LYMPHOCYTES NFR BLD: 22.5 %
MCH RBC QN AUTO: 26.6 PG
MCHC RBC AUTO-ENTMCNC: 31.1 G/DL
MCV RBC AUTO: 86 FL
MONOCYTES # BLD AUTO: 0.4 K/UL
MONOCYTES NFR BLD: 6.9 %
NEUTROPHILS # BLD AUTO: 4.3 K/UL
NEUTROPHILS NFR BLD: 70.1 %
PLATELET # BLD AUTO: 134 K/UL
PMV BLD AUTO: 11.2 FL
RBC # BLD AUTO: 3.53 M/UL
RPR SER QL: NORMAL
WBC # BLD AUTO: 6.09 K/UL

## 2019-03-22 PROCEDURE — 25000003 PHARM REV CODE 250: Performed by: OBSTETRICS & GYNECOLOGY

## 2019-03-22 PROCEDURE — 99231 SBSQ HOSP IP/OBS SF/LOW 25: CPT | Mod: ,,, | Performed by: OBSTETRICS & GYNECOLOGY

## 2019-03-22 PROCEDURE — 36415 COLL VENOUS BLD VENIPUNCTURE: CPT

## 2019-03-22 PROCEDURE — 85025 COMPLETE CBC W/AUTO DIFF WBC: CPT

## 2019-03-22 PROCEDURE — 11000001 HC ACUTE MED/SURG PRIVATE ROOM

## 2019-03-22 PROCEDURE — 99231 PR SUBSEQUENT HOSPITAL CARE,LEVL I: ICD-10-PCS | Mod: ,,, | Performed by: OBSTETRICS & GYNECOLOGY

## 2019-03-22 RX ADMIN — IBUPROFEN 600 MG: 600 TABLET, FILM COATED ORAL at 05:03

## 2019-03-22 RX ADMIN — IBUPROFEN 600 MG: 600 TABLET, FILM COATED ORAL at 12:03

## 2019-03-22 RX ADMIN — IBUPROFEN 600 MG: 600 TABLET, FILM COATED ORAL at 11:03

## 2019-03-22 RX ADMIN — OXYCODONE HYDROCHLORIDE AND ACETAMINOPHEN 1 TABLET: 5; 325 TABLET ORAL at 09:03

## 2019-03-22 RX ADMIN — IBUPROFEN 600 MG: 600 TABLET, FILM COATED ORAL at 06:03

## 2019-03-22 NOTE — PLAN OF CARE
Problem: Adult Inpatient Plan of Care  Goal: Plan of Care Review  Outcome: Ongoing (interventions implemented as appropriate)  Mother will breastfeed on cue at least 8 or more time sin 24 hours. Mother will monitor for adequate supply and monitor wet and dirty diapers. Mother will call for any breastfeeding needs.Nikki at bedside interpreting basic breastfeeding information and teaching. Mother stated she  other child for 1 year. No questions, encouraged to call for needs or assistance. Mother verbalized understanding

## 2019-03-22 NOTE — LACTATION NOTE
[facility]  Lactation Note - Mom    SUMMARY     Maternal Assessment    Breast Size Issue: none  Breast Shape: Bilateral:, round  Breast Density: Bilateral:, soft  Preferred Pain Scale: number (Numeric Rating Pain Scale)  Comfort/Acceptable Pain Level: 4  Pain Body Location - Side: Bilateral  Pain Body Location - Orientation: lower  Pain Body Location: abdomen  Pain Rating (0-10): Rest: 3  Pain Rating (0-10): Activity: 0  Pain Rating: Rest: 0 - no pain  Pain Rating: Activity: 4 - moderate pain  Pain Radiation to: abdomen  Frequency: intermittent  Quality: aching, cramping  Nonverbal Indicators of Pain: muscle tension  Pain Management Interventions: care clustered, pain management plan reviewed with patient/caregiver, pillow support provided, position adjusted, quiet environment facilitated, relaxation techniques promoted  Sleep/Rest/Relaxation: awake, sleeping between care, sleep interrupted  POSS (Pasero Opioid-Induced Sed Scale): 1 - Awake and alert  Fever Reduction/Comfort Measures: medication administered    LATCH Score         Breasts WDL    Breast WDL: WDL    Maternal Infant Feeding    Maternal Preparation: breast care, hand hygiene  Maternal Emotional State: independent, relaxed  Pain with Feeding: yes(2/10 :lanolin given:colostrum to nipples)  Pain Location: nipples, bilateral  Pain Description: soreness  Latch Assistance: no    Lactation Referrals         Lactation Interventions    Breast Care: Breastfeeding: open to air, lanolin to nipples  Breastfeeding Assistance: feeding on demand promoted, feeding cue recognition promoted, support offered  Breastfeeding Support: encouragement provided, feeding on demand promoted, infant-mother separation minimized, support offered  Breast Care: Breastfeeding: open to air, lanolin to nipples  Breastfeeding Assistance: feeding on demand promoted, feeding cue recognition promoted, support offered  Fetal Wellbeing Promotion: fetal heart rate monitored,  uterine contraction activity assessed       Breastfeeding Session         Maternal Information    Person Making Referral: nurse  Maternal Reason for Referral: breastfeeding currently  Infant Reason for Referral:  infant    Mother  other child for 1 year

## 2019-03-22 NOTE — PLAN OF CARE
Problem: Adult Inpatient Plan of Care  Goal: Plan of Care Review  Outcome: Ongoing (interventions implemented as appropriate)  POC reviewed with pt around 0755 using language line (ID# [id number] - Meagan); verbalized acceptance and understanding.  Pt's VS stable.  Remains free from falls and injury.  Pain controlled with ordered meds.  Bonding well with baby.  Baby tolerating feedings; voiding/stooling appropriately.  Exclusively breastfeeding.  Family at bedside to offer support.  JARON.

## 2019-03-22 NOTE — PROGRESS NOTES
"PPD#1 S/P     Subjective: No complaints. No symptoms of pre-eclampsia and BP normal now. History of pre-eclampsia and some elevated BP prior to epidural.     Objective: /74   Pulse 70   Temp 98 °F (36.7 °C) (Oral)   Resp 15   Ht 4' 6" (1.372 m)   Wt 60.3 kg (133 lb)   LMP 2018   SpO2 97%   Breastfeeding? Yes   BMI 32.07 kg/m²     H/H:   Lab Results   Component Value Date    WBC 6.09 2019    HGB 9.4 (L) 2019    HCT 30.2 (L) 2019    MCV 86 2019     (L) 2019       Fundus: Firm, non- tender  Lochia: Moderate  Extremities: Non-tender, no edema    Assessment:   PPD #1 S/P   Some elevated BP prior to epidural now normal  History of pre-eclampsia-- has appointment Friday 3/29 at 8:45am for BP check      Plan: Routine progressive care  Rx sent  Baby girl      "

## 2019-03-22 NOTE — ANESTHESIA POSTPROCEDURE EVALUATION
"Anesthesia Post Evaluation    Patient: Saadia Gayle    Procedure(s) Performed: * No procedures listed *    Final Anesthesia Type: CSE  Patient location during evaluation: floor  Patient participation: Yes- Able to Participate  Level of consciousness: awake and alert and oriented  Post-procedure vital signs: reviewed and stable  Pain management: adequate  Airway patency: patent  PONV status at discharge: No PONV  Anesthetic complications: no      Cardiovascular status: blood pressure returned to baseline and hemodynamically stable  Respiratory status: unassisted, spontaneous ventilation and room air  Hydration status: euvolemic  Follow-up not needed.        Visit Vitals  /73 (BP Location: Left arm, Patient Position: Lying)   Pulse 95   Temp 36.3 °C (97.4 °F) (Oral)   Resp 16   Ht 4' 6" (1.372 m)   Wt 60.3 kg (133 lb)   LMP 06/19/2018   SpO2 97%   Breastfeeding? Yes   BMI 32.07 kg/m²       Pain/Pb Score: Pain Rating Prior to Med Admin: 0 (3/22/2019  5:27 AM)  Pain Rating Post Med Admin: 0 (3/22/2019  1:00 AM)        "

## 2019-03-23 VITALS
WEIGHT: 133 LBS | HEIGHT: 55 IN | SYSTOLIC BLOOD PRESSURE: 124 MMHG | OXYGEN SATURATION: 100 % | TEMPERATURE: 98 F | DIASTOLIC BLOOD PRESSURE: 76 MMHG | HEART RATE: 67 BPM | RESPIRATION RATE: 18 BRPM | BODY MASS INDEX: 30.78 KG/M2

## 2019-03-23 PROCEDURE — 25000003 PHARM REV CODE 250: Performed by: OBSTETRICS & GYNECOLOGY

## 2019-03-23 PROCEDURE — 99238 PR HOSPITAL DISCHARGE DAY,<30 MIN: ICD-10-PCS | Mod: ,,, | Performed by: OBSTETRICS & GYNECOLOGY

## 2019-03-23 PROCEDURE — 99238 HOSP IP/OBS DSCHRG MGMT 30/<: CPT | Mod: ,,, | Performed by: OBSTETRICS & GYNECOLOGY

## 2019-03-23 RX ADMIN — IBUPROFEN 600 MG: 600 TABLET, FILM COATED ORAL at 12:03

## 2019-03-23 RX ADMIN — IBUPROFEN 600 MG: 600 TABLET, FILM COATED ORAL at 05:03

## 2019-03-23 NOTE — PLAN OF CARE
Problem: Adult Inpatient Plan of Care  Goal: Plan of Care Review  Outcome: Ongoing (interventions implemented as appropriate)  Alert, oriented. Fundus firm, vaginal bleeding small in amount. No clots or odor noted. Ambulating independently, Voiding without difficulty. Will continue to provide 8 or more feedings per 24 hr period.

## 2019-03-23 NOTE — PROGRESS NOTES
Plan of care reviewed with patient, pain management/ medications reviewed with patient, instructed patient to notify if patient vaginal bleeding increases or experience clots. Patient verbalized understanding.     Crib safety, safe sleep for , feeding frequency, feeding signs, Breastfeeding guide reviewed/ at bedside. Skin to skin encouraged, Rooming in promoted. Parents verbalized understanding.

## 2019-03-23 NOTE — LACTATION NOTE
[facility]  Lactation Note - Mom    SUMMARY     Maternal Assessment    Breast Size Issue: none  Breast Shape: Bilateral:, round  Breast Density: Bilateral:, soft  Areola: Bilateral:, firm  Nipples: Bilateral:, everted, graspable, short  Left Nipple Symptoms: tender  Right Nipple Symptoms: tender  Preferred Pain Scale: number (Numeric Rating Pain Scale)  Comfort/Acceptable Pain Level: 4  Pain Body Location - Side: Bilateral  Pain Body Location - Orientation: lower  Pain Body Location: abdomen  Pain Rating (0-10): Rest: 0  Pain Rating (0-10): Activity: 0  Pain Rating: Rest: 0 - no pain  Pain Rating: Activity: 4 - moderate pain  Pain Radiation to: abdomen  Frequency: intermittent  Quality: aching, cramping  Nonverbal Indicators of Pain: muscle tension  Pain Management Interventions: care clustered, pain management plan reviewed with patient/caregiver, pillow support provided, position adjusted, quiet environment facilitated, relaxation techniques promoted  Sleep/Rest/Relaxation: awake  POSS (Pasero Opioid-Induced Sed Scale): 1 - Awake and alert  Fever Reduction/Comfort Measures: medication administered    LATCH Score         Breasts WDL    Breast WDL: WDL  Left Nipple Symptoms: tender  Right Nipple Symptoms: tender    Maternal Infant Feeding    Maternal Preparation: breast care, hand hygiene  Maternal Emotional State: independent, relaxed  Infant Positioning: side-lying  Signs of Milk Transfer: infant jaw motion present  Pain with Feeding: yes(with initial latch 4/10)  Pain Location: nipples, bilateral  Pain Description: pulling  Comfort Measures Before/During Feeding: infant position adjusted  Milk Ejection Reflex: absent  Comfort Measures Following Feeding: air-drying encouraged, expressed milk applied(lanolin provided, use encouraged)  Nipple Shape After Feeding, Left: round, everted  Latch Assistance: no(mother independent with achieving good latch-observed)    Lactation Referrals    Lactation  Referrals: pediatric care provider, WIC (women, infants and children) program  Pediatric Care Provider Lactation Follow-up Date/Time: instructed to f/u with ped on Monday for weight check  [facility] Lactation Follow-up Date/Time: offered peer counselor referral but pt declined(inquire about pump through Medicaid/WIC)    Lactation Interventions    Breast Care: Breastfeeding: breast milk to nipples, lanolin to nipples, manual expression to soften breast, milk massaged towards nipple, warm shower encouraged  Breastfeeding Assistance: feeding cue recognition promoted, feeding on demand promoted, feeding session observed, infant latch-on verified, infant stimulated to wakeful state, infant suck/swallow verified, support offered  Breastfeeding Support: encouragement provided, feeding on demand promoted, infant-mother separation minimized, support offered  Breast Care: Breastfeeding: breast milk to nipples, lanolin to nipples, manual expression to soften breast, milk massaged towards nipple, warm shower encouraged  Breastfeeding Assistance: feeding cue recognition promoted, feeding on demand promoted, feeding session observed, infant latch-on verified, infant stimulated to wakeful state, infant suck/swallow verified, support offered  Fetal Wellbeing Promotion: intake and output monitored  Breastfeeding Support: encouragement provided, maternal rest encouraged, maternal nutrition promoted, maternal hydration promoted, diary/feeding log utilized       Breastfeeding Session    Breast Pumping Interventions: other (see comments)(start pumping and give exp br milk if supplement needed)  Infant Positioning: side-lying  Signs of Milk Transfer: infant jaw motion present    Maternal Information    Person Making Referral: nurse  Maternal Reason for Referral: breastfeeding currently  Infant Reason for Referral:  infant

## 2019-03-23 NOTE — PLAN OF CARE
Problem: Adult Inpatient Plan of Care  Goal: Plan of Care Review  Outcome: Outcome(s) achieved Date Met: 03/23/19  Mother will exclusively breastfeed on cue 8 or more times in 24 hours. Will record voids/stools. Will monitor baby for signs of adequate feedings. Will call for assistance if needed. Family supportive at bedside.

## 2019-03-23 NOTE — DISCHARGE SUMMARY
Admitting Diagnosis: IUP at term, IOL, h/o pre-eclampsia in previous pregnancy   Discharge Diagnosis: s/p ,  h/o pre-eclampsia in previous pregnancy   Procedure:   Service: OB-GYN, Charis Rosenberg   Consults: none   Hospital Course: Patient was admitted to L&D for elective IOL. She had a  complicated by shoulder dystocia of a viable female infant.  She was transferred to [facility] baby in stable condition.  Her recovery was uncomplicated and by post-partum day 2 she was tolerating PO without N/V, ambulating without difficulty, her pain was well controlled with PO pain medications and she had passed flatus. She was breastfeeding. She was stable and ready for discharge.   Medications: OTC ibuprofen, Percocet  Disposition: home   Condition: stable for discharge  Follow up: 1 week for BP check and PP visit  Instructions: pre-e precautions, continue ambulation, take medications as needed and take stool softener as needed, pelvic rest until instructed otherwise by physician  Call or return to ED for fever >100.4, foul smelling vaginal discharge, heavy vaginal bleeding, abdominal pain not responsive to medications or other concerns.

## 2019-03-23 NOTE — LACTATION NOTE
This note was copied from a baby's chart.  Discussed in depth the importance of monitoring baby's I&O's. Instructed to keep a log of feedings and voids/stools in 24 hrs until she see her baby's doctor. Instructed to call pediatrician if no stools in 24 hours. Informed mother of other complications besides this being just a feeding issue and that she needs to voice any concerns she may have about her baby to the pediatrician. Encouraged baby's first visit and weight check on Monday with pediatrician. Informed mother that if it is ordered for baby to be supplemented, mother should begin pumping and providing additional expressed breast milk. Discussed alternative feeding methods until breastfeeding well established.       [facility]  Lactation Note - Baby    SUMMARY     Feeding Method    breastfeeding    Breastfeeding    breastfeeding, left side only    LATCH Score    Latch: 2-->grasps breast, tongue down, lips flanged, rhythmic sucking  Audible Swallowin-->spontaneous and intermittent (24 hrs old)  Type of Nipple: 2-->everted (after stimulation)  Comfort (Breast/Nipple): 1-->filling, red/small blisters/bruises, mild/mod discomfort  Hold (Positioning): 2-->no assist from staff, mother able to position/hold infant  Score: 9    Breastfeeding Supplementation         Nutrition Interventions    Breastfeeding Support: encouragement provided, feeding on demand promoted, feeding session observed, infant-mother separation minimized, infant latch-on verified, infant stimulated to wakeful state, support offered

## 2019-03-23 NOTE — DISCHARGE INSTRUCTIONS
"Instrucciones Para Guido de Salima    Instrucciones a Seguir    Dieta regular  Actividad: Aumentarntar gradualmente  Stephanie: Regadera o Valerie  Restricciones: No levantar nada pesado  Cuidado Personal: No tampones o duchas vaginales  Actividad Sexual: Sommer relaciones sexuales  Planificacion Familiar: Consulta con lester medico  Cuidado de los Senos: Use un sosten de soporte  Regresar al trabajo/escuela: Cuando lester medico le indique    Cuando debe llamar al Doctor    *Fiebre de 100.4 o mas alto  *Nausea/Vomito persistente  *Secrecion de la incision  *Johnna sangrado vaginal o coagulos  *Inflamacion/dolor en los brazos o las piernas  *Severo dolor de grupo, vision borrosa or desmayos  *Frequencia/ardor urinario  *Senales de depresion post-parto        La Depresion Post-Parto    Usted acaba de tener un modesto'.  A pesar de que sabe que deberia sentirse emocionada y wilson, lo que seinte son ganas de llorar sin motivo y tiene dificultades para realizar elena tareas diarias.  Usted pasa la mayor parte del tiempo cole, cansada y desesperanzada, y hasta podria sentirse avergonzada o culpable.  Hebert lo que le esta sucediendo no es culpa suya, y puede sentirse mejor.  Hable con lester medico para que la ayude.     La Depresion Despues del Parto    Carroll vez sienta cansancio y ganas de llorar fadia despues de guido a edmund.  Esta etapa melancolica, denominada "baby blues", puede hacerla sentir cole y desesperanzada, o temerosa de que algo genaro le vaya a pasar al modesto.  Algunas mujeres hasta llegan a poner en dominic el que puedan ser buenas madres.    Que' es la Depresion?    La depresion es un trastorno del animo que afecta lester manera de pernsar y de sentir.  El sintoma mas frecuente es un sentimiento de honda tristeza: tambien podria causane la sensacion de que ya no puede sobrellevar la samantha.    Otros sintomas incluyen:      * Ganar o perder mucho peso  * Dormir en exceso o demasiado poco  * Estar cansada todo el tiempo  * Sentirse inquieta  * " Tener miedo de querer herir al modesto'  * No tener interes por el modesto'  * Sentirse inutil o culpable   * No encontrar placer en las cosas que solia gustarle hacer  * Dificultades para pensar con claridad o hiram decisiones  * Pensar sobre la muerte o el suicidio     Que' Causa la Depresion Postparto?    La causea exacta de la depresion postarto se desconce, aunque posiblemente es el resultado de los cambios hormonales que suceden bree y despues del parto.  Tambien puede deberse al cansancio que le causan las exigencias del modesto' y el proceso de adaptacion a lester maternidad.  Todos estos factores podrian deprimirla.  En algunos casos, existe kwaku predisposicion genetica a herlinda tipo de depresion.    La depresion puede tratarse    Lo wheeler es que hay muchas maneras de tratar la depresion postparo.  Emprenda el primer paso para sentirse mejor hablando con lester medico.     Recursos    * National Institutes of Mental Health-- 455-195-1845     www.nimh.nih.gov    * National [place] on Mental Illness -- 172.860.8000     Www.elisabet.org    * Mental Health [place] --  611.916.7321     Www.[facility].org    * National Suidide Hotline -- 264.693.4637    1619-7241 [employer]  Todos los derechos reservados.  Esta informacion no pretende sustituir las atencion medica profesional.  Solo lester medico puede diagnosticar y tratar un problema de rachele.         Patient Discharge Instructions for Postpartum Women    Resume Regular Diet  Increase activity gradually, no heavy lifting  Shower  No tampons, douching or sexual intercourse.  Discuss birth control options with your physician.  Wear a support bra  Return to work/school when you've been cleared by a physician    Call your physician if     *Fever of 100.4 or higher  *Persistent nausea/ vomiting  *Incisional drainage  *Heavy vaginal bleeding or large clots (Heavy bleeding is soaking 1 pad in an hour)  *Swelling and pain in arms or legs  *Severe headaches, blurred vision or  "fainting  *Shortness of breath  *Frequency and burning with urination  *Signs of postpartum depression, discuss these signs with your physician    Call lactation services for questions regarding feeding, nipple and breast care, and general questions about lactation.  They can be reached at [phone]         Understanding Postpartum Depression    You've just had a baby.  You know you should be excited and happy.  But instead you find yourself crying for no reason.  You may have trouble coping with your daily tasks.  You feel sad, tired, and hopeless most of the time.  You may even feel ashamed or guilty.  But what you're going through is not your fault and you can feel better.  Talk to your doctor.  He or she can help.    Depression After Childbirth    You may be weepy and tired right after giving birth.  These feelings are normal.  They're sometimes called the "baby blues."  These blues go away 2-3 weeks.  However, postpartum (meaning "after birth") depression lasts much longer and is more sever than the "baby blues."  It can make you feel sad and hopeless.  You may also fear that your baby will be harmed and worry about being a bad mother.      What is Depression?    Depression is a mood disorder that affects the way you think and feel.  The most common symptom is a feeling of deep sadness.  You may also feel as if you just can't cope with life.    Other symptoms include:      * Gaining or loosing weight  * Sleeping too much or too little  * Feeling tired all the time  * Feeling restless  * Fears of harming your baby   * Lack of interest in your baby  * Feeling worthless or guilty  * No longer finding pleasure in things you used to  * Having trouble thinking clearly or making decisions  * Thoughts of hurting yourself or your baby    What Causes Postpartum Depression    The exact causes of postpartum depression isn't known.  It may be due to changes in your hormones during and after childbirth.  You may also be " tired from caring for your baby and adjusting to being a mother.  All these factors may make you feel depressed.  In some cases, your genes may also play a role.    Depression Can Be Treated    The good news is that there are many ways to treat postpartum depression.  Talking to your doctor is the first step toward feeling better.    Resources:    * National [place] of Mental Health  -- 466.959.5840    www.nimh.nih.gov    * National [place] on Mental Illness --923.340.2091    Www.elisabet.org    * Mental Health [place] -- 311.226.2932     Www.[email or web address]    * National Suicide Hotline --630.403.3450 (800-SUICIDE)    1829-9039 [employer]  All rights reserved.  This information is not intended as a substitute for professional medical care.  Always follow up with your healthcare professional's instructions.

## 2019-03-23 NOTE — PROGRESS NOTES
2019      Patient is doing well with no complaints. Tolerating Po without N/V. Passing flatus. Ambulated without difficulty. Pain controlled with pain medications. Lochia is less than menses. Is breastfeeding.     Temp:  [97.7 °F (36.5 °C)-98.2 °F (36.8 °C)] 97.7 °F (36.5 °C)  Pulse:  [57-70] 67  Resp:  [16-18] 18  SpO2:  [100 %] 100 %  BP: (108-133)/(67-88) 128/73      In bed, NAD, RRR, CTA B, abd S/NT/ND + BS FF and below umbilicus, ext: no edema or tenderness     A/P: 28 y.o.   s/p  PPD 2   With h/o pre-e in previous pregnancy, anemia     1. Continue routine care, meeting all discharge criteria will d/c today

## 2019-03-27 ENCOUNTER — POSTPARTUM VISIT (OUTPATIENT)
Dept: OBSTETRICS AND GYNECOLOGY | Facility: CLINIC | Age: 29
End: 2019-03-27
Payer: MEDICAID

## 2019-03-27 VITALS
HEIGHT: 55 IN | SYSTOLIC BLOOD PRESSURE: 120 MMHG | DIASTOLIC BLOOD PRESSURE: 82 MMHG | BODY MASS INDEX: 28.12 KG/M2 | WEIGHT: 121.5 LBS

## 2019-03-27 DIAGNOSIS — N64.59 BREAST ENGORGEMENT: ICD-10-CM

## 2019-03-27 DIAGNOSIS — Z01.30 BLOOD PRESSURE CHECK: Primary | ICD-10-CM

## 2019-03-27 PROCEDURE — 99213 PR OFFICE/OUTPT VISIT, EST, LEVL III, 20-29 MIN: ICD-10-PCS | Mod: S$PBB,TH,, | Performed by: OBSTETRICS & GYNECOLOGY

## 2019-03-27 PROCEDURE — 99999 PR PBB SHADOW E&M-EST. PATIENT-LVL III: ICD-10-PCS | Mod: PBBFAC,,, | Performed by: OBSTETRICS & GYNECOLOGY

## 2019-03-27 PROCEDURE — 99213 OFFICE O/P EST LOW 20 MIN: CPT | Mod: PBBFAC,TH,PO | Performed by: OBSTETRICS & GYNECOLOGY

## 2019-03-27 PROCEDURE — 99999 PR PBB SHADOW E&M-EST. PATIENT-LVL III: CPT | Mod: PBBFAC,,, | Performed by: OBSTETRICS & GYNECOLOGY

## 2019-03-27 PROCEDURE — 99213 OFFICE O/P EST LOW 20 MIN: CPT | Mod: S$PBB,TH,, | Performed by: OBSTETRICS & GYNECOLOGY

## 2019-03-27 RX ORDER — CIPROFLOXACIN 500 MG/1
500 TABLET ORAL 2 TIMES DAILY
Qty: 14 TABLET | Refills: 0 | Status: SHIPPED | OUTPATIENT
Start: 2019-03-27 | End: 2019-04-03

## 2019-03-27 NOTE — PROGRESS NOTES
"OBSTETRIC HISTORY:   OB History as of 3/21/2019        3    Para   2    Term   2            AB   1    Living   2       SAB   1    TAB        Ectopic        Multiple   0    Live Births   2                  HPI:   28 y.o.  Patient's last menstrual period was 2018.   Patient is  here for blood pressure check after  and complaining of breast pain. Breasts are hard and has chills, swelling and redness of left breast.      PE:   /82   Ht 4' 6" (1.372 m)   Wt 55.1 kg (121 lb 7.6 oz)   LMP 2018   Breastfeeding? Yes   BMI 29.29 kg/m²   PE:   APPEARANCE: Well nourished, well developed, in no acute distress.  CHEST: Lungs clear to auscultation.  HEART: Regular rate and rhythm, no murmurs, rubs or gallops.  ABDOMEN: Soft. No tenderness or masses. No hernias.  BREASTS: Symmetrical, nipples excoriated and redness of right breast marked with pen. Both breasts engorged. No palpable masses.    ASSESSMENT:  BP check  Breast engorgement/mastitis      PLAN:  RTO 1 week  "

## 2019-04-04 ENCOUNTER — POSTPARTUM VISIT (OUTPATIENT)
Dept: OBSTETRICS AND GYNECOLOGY | Facility: CLINIC | Age: 29
End: 2019-04-04
Payer: MEDICAID

## 2019-04-04 VITALS
HEIGHT: 55 IN | SYSTOLIC BLOOD PRESSURE: 125 MMHG | DIASTOLIC BLOOD PRESSURE: 82 MMHG | WEIGHT: 116.31 LBS | BODY MASS INDEX: 26.92 KG/M2

## 2019-04-04 PROCEDURE — 99999 PR PBB SHADOW E&M-EST. PATIENT-LVL III: ICD-10-PCS | Mod: PBBFAC,,, | Performed by: OBSTETRICS & GYNECOLOGY

## 2019-04-04 PROCEDURE — 59430 PR CARE AFTER DELIVERY ONLY: ICD-10-PCS | Mod: S$PBB,,, | Performed by: OBSTETRICS & GYNECOLOGY

## 2019-04-04 PROCEDURE — 99999 PR PBB SHADOW E&M-EST. PATIENT-LVL III: CPT | Mod: PBBFAC,,, | Performed by: OBSTETRICS & GYNECOLOGY

## 2019-04-04 PROCEDURE — 99213 OFFICE O/P EST LOW 20 MIN: CPT | Mod: PBBFAC,TH,PO,25 | Performed by: OBSTETRICS & GYNECOLOGY

## 2019-04-04 RX ORDER — ACETAMINOPHEN AND CODEINE PHOSPHATE 120; 12 MG/5ML; MG/5ML
1 SOLUTION ORAL DAILY
Qty: 28 TABLET | Refills: 5 | Status: SHIPPED | OUTPATIENT
Start: 2019-04-04 | End: 2020-09-29

## 2019-04-04 NOTE — PROGRESS NOTES
"OBSTETRIC HISTORY:   OB History        3    Para   2    Term   2            AB   1    Living   2       SAB   1    TAB        Ectopic        Multiple   0    Live Births   2                    HPI:   28 y.o.  Patient's last menstrual period was 2019.   Patient is  here for follow up of mastitis. No pain. She denies bladder and bowel complaints.    ROS:  GENERAL: Denies weight gain or weight loss. Feeling well overall.   SKIN: Denies rash or lesions.   HEAD: Denies headache.   NODES: Denies enlarged lymph nodes.   CHEST: Denies shortness of breath.   ABDOMEN: No abdominal pain, constipation, diarrhea, nausea, vomiting or rectal bleeding.   URINARY: No frequency, dysuria, hematuria, or burning on urination.  REPRODUCTIVE: See HPI.   BREASTS: See HPI.   HEMATOLOGIC: No easy bruisability.   MUSCULOSKELETAL: Denies joint pain or back pain.   NEUROLOGIC: Denies weakness.   PSYCHIATRIC: Denies depression, anxiety or mood swings.    PE:   /82   Ht 4' 6" (1.372 m)   Wt 52.7 kg (116 lb 4.7 oz)   LMP 2019   Breastfeeding? Yes   BMI 28.04 kg/m²   PE:   APPEARANCE: Well nourished, well developed, in no acute distress.  CHEST: Lungs clear to auscultation.  HEART: Regular rate and rhythm, no murmurs, rubs or gallops.  ABDOMEN: Soft. No tenderness or masses. No hernias.  BREASTS: Symmetrical, no skin changes or visible lesions. No palpable masses, nipple discharge or adenopathy bilaterally.    ASSESSMENT:  Mastitis has resolved-- be sure to complete antibiotics    PLAN:  RTO as scheduled "

## 2020-02-17 PROBLEM — Z3A.37 37 WEEKS GESTATION OF PREGNANCY: Status: RESOLVED | Noted: 2019-03-09 | Resolved: 2020-02-17

## 2020-06-03 ENCOUNTER — OFFICE VISIT (OUTPATIENT)
Dept: OBSTETRICS AND GYNECOLOGY | Facility: CLINIC | Age: 30
End: 2020-06-03
Payer: MEDICAID

## 2020-06-03 ENCOUNTER — LAB VISIT (OUTPATIENT)
Dept: LAB | Facility: HOSPITAL | Age: 30
End: 2020-06-03
Attending: OBSTETRICS & GYNECOLOGY
Payer: MEDICAID

## 2020-06-03 VITALS
WEIGHT: 135.38 LBS | BODY MASS INDEX: 31.33 KG/M2 | HEIGHT: 55 IN | SYSTOLIC BLOOD PRESSURE: 120 MMHG | DIASTOLIC BLOOD PRESSURE: 78 MMHG

## 2020-06-03 DIAGNOSIS — N91.2 AMENORRHEA: Primary | ICD-10-CM

## 2020-06-03 DIAGNOSIS — N91.2 AMENORRHEA: ICD-10-CM

## 2020-06-03 LAB
ABO + RH BLD: NORMAL
ANION GAP SERPL CALC-SCNC: 7 MMOL/L (ref 8–16)
B-HCG UR QL: POSITIVE
BASOPHILS # BLD AUTO: 0.01 K/UL (ref 0–0.2)
BASOPHILS NFR BLD: 0.2 % (ref 0–1.9)
BLD GP AB SCN CELLS X3 SERPL QL: NORMAL
BUN SERPL-MCNC: 6 MG/DL (ref 6–20)
CALCIUM SERPL-MCNC: 9 MG/DL (ref 8.7–10.5)
CHLORIDE SERPL-SCNC: 107 MMOL/L (ref 95–110)
CO2 SERPL-SCNC: 25 MMOL/L (ref 23–29)
CREAT SERPL-MCNC: 0.6 MG/DL (ref 0.5–1.4)
CTP QC/QA: YES
DIFFERENTIAL METHOD: ABNORMAL
EOSINOPHIL # BLD AUTO: 0 K/UL (ref 0–0.5)
EOSINOPHIL NFR BLD: 0.2 % (ref 0–8)
ERYTHROCYTE [DISTWIDTH] IN BLOOD BY AUTOMATED COUNT: 13.6 % (ref 11.5–14.5)
EST. GFR  (AFRICAN AMERICAN): >60 ML/MIN/1.73 M^2
EST. GFR  (NON AFRICAN AMERICAN): >60 ML/MIN/1.73 M^2
ESTIMATED AVG GLUCOSE: 91 MG/DL (ref 68–131)
GLUCOSE SERPL-MCNC: 101 MG/DL (ref 70–110)
HBA1C MFR BLD HPLC: 4.8 % (ref 4–5.6)
HCT VFR BLD AUTO: 35.3 % (ref 37–48.5)
HGB BLD-MCNC: 11.8 G/DL (ref 12–16)
IMM GRANULOCYTES # BLD AUTO: 0.01 K/UL (ref 0–0.04)
IMM GRANULOCYTES NFR BLD AUTO: 0.2 % (ref 0–0.5)
LYMPHOCYTES # BLD AUTO: 1.1 K/UL (ref 1–4.8)
LYMPHOCYTES NFR BLD: 17.9 % (ref 18–48)
MCH RBC QN AUTO: 31 PG (ref 27–31)
MCHC RBC AUTO-ENTMCNC: 33.4 G/DL (ref 32–36)
MCV RBC AUTO: 93 FL (ref 82–98)
MONOCYTES # BLD AUTO: 0.4 K/UL (ref 0.3–1)
MONOCYTES NFR BLD: 7.1 % (ref 4–15)
NEUTROPHILS # BLD AUTO: 4.4 K/UL (ref 1.8–7.7)
NEUTROPHILS NFR BLD: 74.4 % (ref 38–73)
NRBC BLD-RTO: 0 /100 WBC
PLATELET # BLD AUTO: 220 K/UL (ref 150–350)
PMV BLD AUTO: 10.7 FL (ref 9.2–12.9)
POTASSIUM SERPL-SCNC: 3.8 MMOL/L (ref 3.5–5.1)
RBC # BLD AUTO: 3.81 M/UL (ref 4–5.4)
SODIUM SERPL-SCNC: 139 MMOL/L (ref 136–145)
WBC # BLD AUTO: 5.91 K/UL (ref 3.9–12.7)

## 2020-06-03 PROCEDURE — 80048 BASIC METABOLIC PNL TOTAL CA: CPT

## 2020-06-03 PROCEDURE — 83036 HEMOGLOBIN GLYCOSYLATED A1C: CPT

## 2020-06-03 PROCEDURE — 88175 CYTOPATH C/V AUTO FLUID REDO: CPT | Mod: 91 | Performed by: PATHOLOGY

## 2020-06-03 PROCEDURE — 87624 HPV HI-RISK TYP POOLED RSLT: CPT

## 2020-06-03 PROCEDURE — 36415 COLL VENOUS BLD VENIPUNCTURE: CPT

## 2020-06-03 PROCEDURE — 87086 URINE CULTURE/COLONY COUNT: CPT

## 2020-06-03 PROCEDURE — 81025 URINE PREGNANCY TEST: CPT | Mod: PBBFAC,PO | Performed by: OBSTETRICS & GYNECOLOGY

## 2020-06-03 PROCEDURE — 99203 PR OFFICE/OUTPT VISIT, NEW, LEVL III, 30-44 MIN: ICD-10-PCS | Mod: S$PBB,TH,, | Performed by: OBSTETRICS & GYNECOLOGY

## 2020-06-03 PROCEDURE — 88141 PR  CYTOPATH CERV/VAG INTERPRET: ICD-10-PCS | Mod: ,,, | Performed by: PATHOLOGY

## 2020-06-03 PROCEDURE — 87481 CANDIDA DNA AMP PROBE: CPT | Mod: 59

## 2020-06-03 PROCEDURE — 86592 SYPHILIS TEST NON-TREP QUAL: CPT

## 2020-06-03 PROCEDURE — 99213 OFFICE O/P EST LOW 20 MIN: CPT | Mod: PBBFAC,PO | Performed by: OBSTETRICS & GYNECOLOGY

## 2020-06-03 PROCEDURE — 87340 HEPATITIS B SURFACE AG IA: CPT

## 2020-06-03 PROCEDURE — 87186 SC STD MICRODIL/AGAR DIL: CPT

## 2020-06-03 PROCEDURE — 86762 RUBELLA ANTIBODY: CPT

## 2020-06-03 PROCEDURE — 99999 PR PBB SHADOW E&M-EST. PATIENT-LVL III: ICD-10-PCS | Mod: PBBFAC,,, | Performed by: OBSTETRICS & GYNECOLOGY

## 2020-06-03 PROCEDURE — 99999 PR PBB SHADOW E&M-EST. PATIENT-LVL III: CPT | Mod: PBBFAC,,, | Performed by: OBSTETRICS & GYNECOLOGY

## 2020-06-03 PROCEDURE — 88141 CYTOPATH C/V INTERPRET: CPT | Mod: ,,, | Performed by: PATHOLOGY

## 2020-06-03 PROCEDURE — 87077 CULTURE AEROBIC IDENTIFY: CPT

## 2020-06-03 PROCEDURE — 87088 URINE BACTERIA CULTURE: CPT

## 2020-06-03 PROCEDURE — 86703 HIV-1/HIV-2 1 RESULT ANTBDY: CPT

## 2020-06-03 PROCEDURE — 83020 HEMOGLOBIN ELECTROPHORESIS: CPT

## 2020-06-03 PROCEDURE — 85025 COMPLETE CBC W/AUTO DIFF WBC: CPT

## 2020-06-03 PROCEDURE — 86850 RBC ANTIBODY SCREEN: CPT

## 2020-06-03 PROCEDURE — 87491 CHLMYD TRACH DNA AMP PROBE: CPT

## 2020-06-03 PROCEDURE — 81220 CFTR GENE COM VARIANTS: CPT

## 2020-06-03 PROCEDURE — 87661 TRICHOMONAS VAGINALIS AMPLIF: CPT

## 2020-06-03 PROCEDURE — 99203 OFFICE O/P NEW LOW 30 MIN: CPT | Mod: S$PBB,TH,, | Performed by: OBSTETRICS & GYNECOLOGY

## 2020-06-03 RX ORDER — ASPIRIN 81 MG/1
81 TABLET ORAL DAILY
Qty: 150 TABLET | Refills: 2 | Status: SHIPPED | OUTPATIENT
Start: 2020-06-03 | End: 2021-06-03

## 2020-06-03 RX ORDER — BUTALBITAL, ACETAMINOPHEN AND CAFFEINE 50; 325; 40 MG/1; MG/1; MG/1
1 TABLET ORAL EVERY 4 HOURS PRN
Qty: 25 TABLET | Refills: 0 | Status: SHIPPED | OUTPATIENT
Start: 2020-06-03 | End: 2020-07-03

## 2020-06-03 NOTE — PROGRESS NOTES
OBSTETRICS /GYNECOLOGY     CC: Patient here for prenatal care .        LMP = 20     Saadia Gayle is a 30 y.o. female  presents with complaint of absence of menstruation.      Fetal movements: Yes  Abdominal cramping:  No  Vaginal Bleeding:  No  Leakage of Fluid:  No  Passage of tissue:  No  Breast Tenderness:  No  Nausea/Vomiting:  No    Risks/History is pertinent for late prenatal care    Hx Preeclapsia previous pregnancy   Hx Migraines headaches         Past Medical History:   Diagnosis Date    Hypertension in pregnancy 2014     Past Surgical History:   Procedure Laterality Date    DILATION AND CURETTAGE OF UTERUS  2017    SAB     Social History     Socioeconomic History    Marital status:      Spouse name: Not on file    Number of children: Not on file    Years of education: Not on file    Highest education level: Not on file   Occupational History    Not on file   Social Needs    Financial resource strain: Not on file    Food insecurity:     Worry: Not on file     Inability: Not on file    Transportation needs:     Medical: Not on file     Non-medical: Not on file   Tobacco Use    Smoking status: Never Smoker    Smokeless tobacco: Never Used   Substance and Sexual Activity    Alcohol use: No     Frequency: Never    Drug use: No    Sexual activity: Yes     Partners: Male   Lifestyle    Physical activity:     Days per week: Not on file     Minutes per session: Not on file    Stress: Not on file   Relationships    Social connections:     Talks on phone: Not on file     Gets together: Not on file     Attends Denominational service: Not on file     Active member of club or organization: Not on file     Attends meetings of clubs or organizations: Not on file     Relationship status: Not on file   Other Topics Concern    Not on file   Social History Narrative    Not on file     Family History   Problem Relation Age of Onset    Hypertension Father 53    Breast cancer Neg Hx      "Colon cancer Neg Hx     Ovarian cancer Neg Hx      OB History    Para Term  AB Living   4 2 2   1 2   SAB TAB Ectopic Multiple Live Births   1     0 2      # Outcome Date GA Lbr Vito/2nd Weight Sex Delivery Anes PTL Lv   4 Current            3 Term 19 39w2d 04:41 / 00:22 3.685 kg (8 lb 2 oz) F Vag-Spont Spinal, EPI N ANNA MARIE      Complications: Shoulder Dystocia   2 SAB  12w0d          1 Term 14 40w0d   M Vag-Spont None N ANNA MARIE      Complications: Preeclampsia, Hypertension in pregnancy       /78 (BP Location: Right arm)   Ht 4' 6" (1.372 m)   Wt 61.4 kg (135 lb 5.8 oz)   LMP 2020   BMI 32.64 kg/m²     ROS:  GENERAL: Denies weight gain or weight loss. Feeling well overall.   SKIN: Denies rash or lesions.   HEAD: Denies head injury or headache.   NODES: Denies enlarged lymph nodes.   CHEST: Denies chest pain or shortness of breath.   CARDIOVASCULAR: Denies palpitations or left sided chest pain.   ABDOMEN: No abdominal pain, constipation, diarrhea, nausea, vomiting or rectal bleeding.   URINARY: No frequency, dysuria, hematuria, or burning on urination.  REPRODUCTIVE: See HPI.   BREASTS: The patient performs breast self-examination and denies pain, lumps, or nipple discharge.   HEMATOLOGIC: No easy bruisability or excessive bleeding.   MUSCULOSKELETAL: Denies joint pain or swelling.   NEUROLOGIC: Denies syncope or weakness.   PSYCHIATRIC: Denies depression, anxiety or mood swings.    PE:   APPEARANCE: Well nourished, well developed, in no acute distress.  AFFECT: WNL, alert and oriented x 3.  SKIN: No acne or hirsutism.  NECK: Neck symmetric without masses or thyromegaly.  NODES: No inguinal, cervical, axillary or femoral lymph node enlargement.  CHEST: Good respiratory effort.   ABDOMEN: Soft. No tenderness.   Fundal Height : 21  FHT present     BREASTS: Symmetrical, no skin changes or visible lesions. No palpable masses, nipple discharge bilaterally.  PELVIC:   SSE- normal " vaginal and cervix. No  significant vaginal discharge   SVE = closed , long   EXTREMITIES: No edema.          ASSESSMENT and PLAN:    1-IUP 20 weeks  2-Hx Preeclampsia   3 Hx migraines HA   4- Undesired fertility     Prenatal Labs  Dating US  GC/CT  Affirm   PAP    Patient was counseled today on proper weight gain based on the [place] [facility]'s recommendations based on her pre-pregnancy weight. Discussed foods to avoid in pregnancy (i.e. sushi, fish that are high in mercury, deli meat, and unpasteurized cheeses). Discussed prenatal vitamin options (i.e. stool softener, DHA). Contingency screen offered - patient desires.    Rx for  ASA   FIoricet PRN   PNV         Follow up in  4 weeks or PRN     Ismael Jama M.D.   OB/GYN    6/3/2020

## 2020-06-04 LAB
HGB A2 MFR BLD HPLC: 2.5 % (ref 2.2–3.2)
HGB FRACT BLD ELPH-IMP: NORMAL
HGB FRACT BLD ELPH-IMP: NORMAL
HIV 1+2 AB+HIV1 P24 AG SERPL QL IA: NEGATIVE
RPR SER QL: NORMAL
RUBV IGG SER-ACNC: 76.1 IU/ML
RUBV IGG SER-IMP: REACTIVE

## 2020-06-05 LAB
BACTERIAL VAGINOSIS DNA: POSITIVE
CANDIDA GLABRATA DNA: NEGATIVE
CANDIDA KRUSEI DNA: NEGATIVE
CANDIDA RRNA VAG QL PROBE: NEGATIVE
HBV SURFACE AG SERPL QL IA: NEGATIVE
T VAGINALIS RRNA GENITAL QL PROBE: NEGATIVE

## 2020-06-08 ENCOUNTER — TELEPHONE (OUTPATIENT)
Dept: OBSTETRICS AND GYNECOLOGY | Facility: HOSPITAL | Age: 30
End: 2020-06-08

## 2020-06-08 LAB
BACTERIA UR CULT: ABNORMAL
C TRACH DNA SPEC QL NAA+PROBE: NOT DETECTED
CFTR MUT ANL BLD/T: NORMAL
N GONORRHOEA DNA SPEC QL NAA+PROBE: NOT DETECTED

## 2020-06-08 RX ORDER — NITROFURANTOIN (MACROCRYSTALS) 100 MG/1
100 CAPSULE ORAL EVERY 6 HOURS
Qty: 28 CAPSULE | Refills: 0 | Status: SHIPPED | OUTPATIENT
Start: 2020-06-08 | End: 2020-09-29

## 2020-06-08 RX ORDER — METRONIDAZOLE 7.5 MG/G
1 GEL VAGINAL NIGHTLY
Qty: 7 APPLICATOR | Refills: 0 | Status: SHIPPED | OUTPATIENT
Start: 2020-06-08 | End: 2020-06-15

## 2020-06-08 NOTE — TELEPHONE ENCOUNTER
Patient informed that urine culture came back positive for uti and that medication was sent to her and that affirm came back positive for BV and that metrogel was sent to her pharmacy. Patient verbalized understanding.

## 2020-06-08 NOTE — TELEPHONE ENCOUNTER
GC/CT neg  Affirm resulted  Positive for BV  Rx for flagyl sent to pharmacy on file  Please inform patient    Urine culture result was reviewed:    Positive   DX=> UTI     Rx for antibiotic sent to pharmacy  Please inform patient.   Delta Jama M.D.   OB/GYN    6/8/2020

## 2020-06-10 LAB
FINAL PATHOLOGIC DIAGNOSIS: ABNORMAL
Lab: ABNORMAL

## 2020-06-16 LAB
HPV HR 12 DNA SPEC QL NAA+PROBE: NEGATIVE
HPV16 AG SPEC QL: POSITIVE
HPV18 DNA SPEC QL NAA+PROBE: NEGATIVE

## 2020-06-17 ENCOUNTER — PROCEDURE VISIT (OUTPATIENT)
Dept: OBSTETRICS AND GYNECOLOGY | Facility: CLINIC | Age: 30
End: 2020-06-17
Payer: MEDICAID

## 2020-06-17 DIAGNOSIS — Z36.89 ESTABLISH GESTATIONAL AGE, ULTRASOUND: ICD-10-CM

## 2020-06-17 DIAGNOSIS — N91.2 AMENORRHEA: ICD-10-CM

## 2020-06-17 DIAGNOSIS — Z36.89 ENCOUNTER FOR FETAL ANATOMIC SURVEY: ICD-10-CM

## 2020-06-17 PROCEDURE — 76805 PR US, OB 14+WKS, TRANSABD, SINGLE GESTATION: ICD-10-PCS | Mod: 26,S$PBB,, | Performed by: OBSTETRICS & GYNECOLOGY

## 2020-06-17 PROCEDURE — 76805 OB US >/= 14 WKS SNGL FETUS: CPT | Mod: PBBFAC,PO | Performed by: OBSTETRICS & GYNECOLOGY

## 2020-06-17 PROCEDURE — 76805 OB US >/= 14 WKS SNGL FETUS: CPT | Mod: 26,S$PBB,, | Performed by: OBSTETRICS & GYNECOLOGY

## 2020-06-18 ENCOUNTER — TELEPHONE (OUTPATIENT)
Dept: OBSTETRICS AND GYNECOLOGY | Facility: CLINIC | Age: 30
End: 2020-06-18

## 2020-06-19 NOTE — TELEPHONE ENCOUNTER
ASCUS PAP  +HPV    Needs hillary Jama M.D.   OB/GYN    6/18/2020    
Patient informed that her pap came back abnormal and that she needs a colpo. Patient wants to do it the same day she comes in for her ob visit.   
54

## 2020-07-01 ENCOUNTER — ROUTINE PRENATAL (OUTPATIENT)
Dept: OBSTETRICS AND GYNECOLOGY | Facility: CLINIC | Age: 30
End: 2020-07-01
Payer: MEDICAID

## 2020-07-01 VITALS
BODY MASS INDEX: 33.33 KG/M2 | WEIGHT: 138.25 LBS | DIASTOLIC BLOOD PRESSURE: 72 MMHG | SYSTOLIC BLOOD PRESSURE: 100 MMHG

## 2020-07-01 DIAGNOSIS — R87.610 ASCUS OF CERVIX WITH NEGATIVE HIGH RISK HPV: ICD-10-CM

## 2020-07-01 DIAGNOSIS — Z3A.25 25 WEEKS GESTATION OF PREGNANCY: Primary | ICD-10-CM

## 2020-07-01 DIAGNOSIS — Z34.82 ENCOUNTER FOR SUPERVISION OF OTHER NORMAL PREGNANCY IN SECOND TRIMESTER: ICD-10-CM

## 2020-07-01 PROCEDURE — 57455 BIOPSY OF CERVIX W/SCOPE: CPT | Mod: PBBFAC,PO | Performed by: OBSTETRICS & GYNECOLOGY

## 2020-07-01 PROCEDURE — 99999 PR PBB SHADOW E&M-EST. PATIENT-LVL III: ICD-10-PCS | Mod: PBBFAC,,, | Performed by: OBSTETRICS & GYNECOLOGY

## 2020-07-01 PROCEDURE — 57455 PR COLPOSCOPY,CERVIX W/ADJ VAGINA,W/BX: ICD-10-PCS | Mod: S$PBB,,, | Performed by: OBSTETRICS & GYNECOLOGY

## 2020-07-01 PROCEDURE — 57455 BIOPSY OF CERVIX W/SCOPE: CPT | Mod: S$PBB,,, | Performed by: OBSTETRICS & GYNECOLOGY

## 2020-07-01 PROCEDURE — 99213 PR OFFICE/OUTPT VISIT, EST, LEVL III, 20-29 MIN: ICD-10-PCS | Mod: S$PBB,TH,25, | Performed by: OBSTETRICS & GYNECOLOGY

## 2020-07-01 PROCEDURE — 99999 PR PBB SHADOW E&M-EST. PATIENT-LVL III: CPT | Mod: PBBFAC,,, | Performed by: OBSTETRICS & GYNECOLOGY

## 2020-07-01 PROCEDURE — 99213 OFFICE O/P EST LOW 20 MIN: CPT | Mod: PBBFAC,TH,PO,25 | Performed by: OBSTETRICS & GYNECOLOGY

## 2020-07-01 PROCEDURE — 99213 OFFICE O/P EST LOW 20 MIN: CPT | Mod: S$PBB,TH,25, | Performed by: OBSTETRICS & GYNECOLOGY

## 2020-07-01 NOTE — PROGRESS NOTES
No complaints today   No nausea or vomiting   Denies vaginal bleeding or cramping     Prenatal labs reviewed  Aneuploidy screen  : offered  / declined ( x )    Anatomy US at 20 weeks   Consents=signed including BTL  Diabetes screen=   TdaP=  Growth US=  GBBS=  BC=     General Pregnancy  recommendations given  PNV + H2O intake    FU in 2 (-)  4  (x)  weeks     Colposcopy  today  See note     Ismael Jama M.D.   OB/GYN

## 2020-07-01 NOTE — PROGRESS NOTES
COLPOSCOPY:    DATE+7/1/2020  Physician:Ismael Zee Alvina Gayle is a 30 y.o. female who presents for a colposcopy secondary to abnormal pap smear.      UPT is negative.    INDICATIONS: Her most recent papsmear showed: ASCUS with POSITIVE high risk HPV    She does not have a history of abnormal pap smears.      PRE-COLPOSCOPY PROCEDURE COUNSELING:  Discussed the abnormal pap test findings, HPV, need for colposcopy and possible biopsies to determine a diagnosis and plan of care, treatments available, the minimal risks of bleeding and infection with a colposcopy, alternatives to colposcopy and she agrees to proceed.  Patient was given the opportunity to ask questions and verbal consent was obtained.        COLPOSCOPY EXAM:   TIME OUT PERFORMED.     Physical Exam    Inadequate colpo due to pregnancy   Biopsy was not taken   The speculum was removed. The patient tolerated the procedure well.  There were no complications.      IMPRESSION:   Abnormal Pap 795.09    POST COLPOSCOPY COUNSELING:   Manage post colposcopy cramping with NSAIDs, Tylenol or Rx per [employer].  Avoid anything in vagina (intercourse, douching, tampons) one week after the procedure.  Expect a clumpy blackish vaginal discharge (Monsel's solution) for several days.  Report bleeding heavier than a period, worsening pain, fever > 101.0 F, or foul-smelling vaginal discharge.  HPV vaccine recommended according to FDA age guidelines.  Importance of follow-up stressed.    Counseling lasted approximately 15 minutes and all her questions were answered.    FOLLOW-UP:   Will repeat colposcopy post partum     Ismael Jama M.D.   OB/GYN

## 2020-07-29 ENCOUNTER — CLINICAL SUPPORT (OUTPATIENT)
Dept: OBSTETRICS AND GYNECOLOGY | Facility: CLINIC | Age: 30
End: 2020-07-29
Payer: MEDICAID

## 2020-07-29 ENCOUNTER — ROUTINE PRENATAL (OUTPATIENT)
Dept: OBSTETRICS AND GYNECOLOGY | Facility: CLINIC | Age: 30
End: 2020-07-29
Payer: MEDICAID

## 2020-07-29 VITALS
WEIGHT: 142.19 LBS | DIASTOLIC BLOOD PRESSURE: 70 MMHG | BODY MASS INDEX: 34.29 KG/M2 | SYSTOLIC BLOOD PRESSURE: 120 MMHG

## 2020-07-29 DIAGNOSIS — R87.610 ATYPICAL SQUAMOUS CELLS OF UNDETERMINED SIGNIFICANCE (ASCUS) ON PAPANICOLAOU SMEAR OF CERVIX: ICD-10-CM

## 2020-07-29 DIAGNOSIS — Z23 NEED FOR DIPHTHERIA-TETANUS-PERTUSSIS (TDAP) VACCINE: ICD-10-CM

## 2020-07-29 DIAGNOSIS — Z3A.29 29 WEEKS GESTATION OF PREGNANCY: Primary | ICD-10-CM

## 2020-07-29 PROCEDURE — 99999 PR PBB SHADOW E&M-EST. PATIENT-LVL II: CPT | Mod: PBBFAC,,, | Performed by: OBSTETRICS & GYNECOLOGY

## 2020-07-29 PROCEDURE — 99213 PR OFFICE/OUTPT VISIT, EST, LEVL III, 20-29 MIN: ICD-10-PCS | Mod: S$PBB,TH,, | Performed by: OBSTETRICS & GYNECOLOGY

## 2020-07-29 PROCEDURE — 90471 IMMUNIZATION ADMIN: CPT | Mod: PBBFAC,PO

## 2020-07-29 PROCEDURE — 99212 OFFICE O/P EST SF 10 MIN: CPT | Mod: PBBFAC,PO,25 | Performed by: OBSTETRICS & GYNECOLOGY

## 2020-07-29 PROCEDURE — 99213 OFFICE O/P EST LOW 20 MIN: CPT | Mod: S$PBB,TH,, | Performed by: OBSTETRICS & GYNECOLOGY

## 2020-07-29 PROCEDURE — 99999 PR PBB SHADOW E&M-EST. PATIENT-LVL II: ICD-10-PCS | Mod: PBBFAC,,, | Performed by: OBSTETRICS & GYNECOLOGY

## 2020-07-29 NOTE — PROGRESS NOTES
No complaints today   No nausea or vomiting   Denies vaginal bleeding or cramping     Prenatal labs reviewed  Aneuploidy screen  : offered  / declined ( x )    Anatomy US at 20 weeks   Consents=  Diabetes screen=  TdaP=  Growth US=  GBBS=  BC=     General Pregnancy  recommendations given  PNV + H2O intake    FU in 2 (-)  4  (x)  weeks      Ismael Jama M.D.   OB/GYN

## 2020-07-29 NOTE — PROGRESS NOTES
7/29/20 at 1345 administered 0.5 ml of Adacel (Tdap) to L deltoid.   Pt tolerated well.   Pt advised to wait 15 minutes before leaving the office.   Pt verbalized understanding.   Pt received VIS.  Lot: [id number]  Exp: 4/12/22  Man: [employer] Pasteur Leana [place]os, MA interpreted all aspects of this visit

## 2020-08-03 ENCOUNTER — HOSPITAL ENCOUNTER (EMERGENCY)
Facility: HOSPITAL | Age: 30
Discharge: HOME | End: 2020-08-03
Attending: EMERGENCY MEDICINE
Payer: MEDICAID

## 2020-08-03 VITALS
WEIGHT: 145 LBS | TEMPERATURE: 98 F | BODY MASS INDEX: 27.38 KG/M2 | OXYGEN SATURATION: 99 % | HEART RATE: 94 BPM | RESPIRATION RATE: 19 BRPM | SYSTOLIC BLOOD PRESSURE: 108 MMHG | DIASTOLIC BLOOD PRESSURE: 74 MMHG | HEIGHT: 61 IN

## 2020-08-03 DIAGNOSIS — K08.89 PAIN, DENTAL: Primary | ICD-10-CM

## 2020-08-03 PROCEDURE — 99283 EMERGENCY DEPT VISIT LOW MDM: CPT

## 2020-08-03 PROCEDURE — 25000003 PHARM REV CODE 250: Performed by: EMERGENCY MEDICINE

## 2020-08-03 RX ORDER — ACETAMINOPHEN 500 MG
1000 TABLET ORAL
Status: COMPLETED | OUTPATIENT
Start: 2020-08-03 | End: 2020-08-03

## 2020-08-03 RX ORDER — BUTALBITAL, ACETAMINOPHEN AND CAFFEINE 50; 325; 40 MG/1; MG/1; MG/1
1 TABLET ORAL EVERY 4 HOURS PRN
Qty: 15 TABLET | Refills: 0 | Status: SHIPPED | OUTPATIENT
Start: 2020-08-03 | End: 2020-09-02

## 2020-08-03 RX ADMIN — ACETAMINOPHEN 1000 MG: 500 TABLET, FILM COATED ORAL at 07:08

## 2020-08-03 NOTE — ED PROVIDER NOTES
Encounter Date: 8/3/2020    SCRIBE #1 NOTE: IMabel , am scribing for, and in the presence of, Dr. Josh Bass.       History     Chief Complaint   Patient presents with    Dental Pain     Pt presents to ED with c/o left sided dental pain that began at apporx 2 am. States she feels like her gums are swollen.       Time seen by provider: 7:06 AM on 08/03/2020    The patient is a 30 y.o. female who presents to the ED with complaint of dental pain that started at 2 am this morning. Pt is pregnant.  She denies fever or chills.  No pelvic pain or vaginal bleeding.        The history is limited by a language barrier.     Review of patient's allergies indicates:   Allergen Reactions    Penicillins Hives and Shortness Of Breath    Amoxicillin      Past Medical History:   Diagnosis Date    Hypertension in pregnancy 2014     Past Surgical History:   Procedure Laterality Date    DILATION AND CURETTAGE OF UTERUS  2017    SAB     Family History   Problem Relation Age of Onset    Hypertension Father 53    Breast cancer Neg Hx     Colon cancer Neg Hx     Ovarian cancer Neg Hx      Social History     Tobacco Use    Smoking status: Never Smoker    Smokeless tobacco: Never Used   Substance Use Topics    Alcohol use: No     Frequency: Never    Drug use: No     Review of Systems   Unable to perform ROS: Other (language barrier)   Constitutional: Negative for fever.   HENT: Positive for dental problem (dental pain). Negative for facial swelling.    Genitourinary: Negative for pelvic pain and vaginal bleeding.   All other systems reviewed and are negative.      Physical Exam     Initial Vitals   BP Pulse Resp Temp SpO2   08/03/20 0659 08/03/20 0659 08/03/20 0659 08/03/20 0658 08/03/20 0659   131/76 94 18 97.8 °F (36.6 °C) 98 %      MAP       --                Physical Exam    Nursing note and vitals reviewed.  Constitutional:  Non-toxic appearance. No distress.   HENT:   Head: Normocephalic and atraumatic.    No  facial swelling. Tympanic membranes are normal in appearance. No obvious dental decay.   Eyes: Conjunctivae are normal. Pupils are equal, round, and reactive to light.   Neck: Neck supple.   Cardiovascular: Normal rate, regular rhythm and normal heart sounds.   No murmur heard.  Pulmonary/Chest: Breath sounds normal. No respiratory distress. She has no wheezes. She has no rales.   Abdominal: Soft. She exhibits no distension. There is no abdominal tenderness.   Neurological: She is alert. No cranial nerve deficit.   Skin: Skin is warm. No rash noted.   Psychiatric: She has a normal mood and affect. Her behavior is normal.       ED Course   Procedures  Labs Reviewed - No data to display       Imaging Results    None                                      Clinical Impression:     1. Pain, dental                  I, Dr. Jin Bass, personally performed the services described in this documentation. All medical record entries made by the scribe were at my direction and in my presence. I have reviewed the chart and agree that the record reflects my personal performance and is accurate and complete. Jin Bass MD.  7:41 AM 08/03/2020             Jin Bass MD  08/03/20 1011

## 2020-08-03 NOTE — ED NOTES
APPEARANCE: Alert, oriented and in no acute distress.  CARDIAC: Normal rate and rhythm, no murmur heard.   PERIPHERAL VASCULAR: peripheral pulses present. Normal cap refill. No edema. Warm to touch.    RESPIRATORY:Normal rate and effort, breath sounds clear bilaterally throughout chest. Respirations are equal and unlabored no obvious signs of distress.  GASTRO: soft, bowel sounds normal, no tenderness, no abdominal distention.  MUSC: Full ROM. No bony tenderness or soft tissue tenderness. No obvious deformity.  SKIN: Skin is warm and dry, normal skin turgor, mucous membranes moist.  NEURO: 5/5 strength major flexors/extensors bilaterally. Sensory intact to light touch bilaterally. [place] coma scale: eyes open spontaneously-4, oriented & converses-5, obeys commands-6. No neurological abnormalities.   MENTAL STATUS: awake, alert and aware of environment.  EYE: PERRL, both eyes: pupils brisk and reactive to light. Normal size.  ENT: EARS: no obvious drainage. NOSE: no active bleeding.   Pt complains of left back of mouth pain that started about 0200 this morning, gums are swollen. Pt is 7.5 months pregnant. Dr. Jama is her OB/GYN.

## 2020-08-19 ENCOUNTER — ROUTINE PRENATAL (OUTPATIENT)
Dept: OBSTETRICS AND GYNECOLOGY | Facility: CLINIC | Age: 30
End: 2020-08-19
Payer: MEDICAID

## 2020-08-19 VITALS
WEIGHT: 144.63 LBS | SYSTOLIC BLOOD PRESSURE: 100 MMHG | BODY MASS INDEX: 27.33 KG/M2 | DIASTOLIC BLOOD PRESSURE: 70 MMHG

## 2020-08-19 DIAGNOSIS — Z3A.32 32 WEEKS GESTATION OF PREGNANCY: Primary | ICD-10-CM

## 2020-08-19 PROCEDURE — 99999 PR PBB SHADOW E&M-EST. PATIENT-LVL III: ICD-10-PCS | Mod: PBBFAC,,, | Performed by: OBSTETRICS & GYNECOLOGY

## 2020-08-19 PROCEDURE — 99213 OFFICE O/P EST LOW 20 MIN: CPT | Mod: PBBFAC,PO | Performed by: OBSTETRICS & GYNECOLOGY

## 2020-08-19 PROCEDURE — 99999 PR PBB SHADOW E&M-EST. PATIENT-LVL III: CPT | Mod: PBBFAC,,, | Performed by: OBSTETRICS & GYNECOLOGY

## 2020-08-19 PROCEDURE — 99213 PR OFFICE/OUTPT VISIT, EST, LEVL III, 20-29 MIN: ICD-10-PCS | Mod: S$PBB,TH,, | Performed by: OBSTETRICS & GYNECOLOGY

## 2020-08-19 PROCEDURE — 99213 OFFICE O/P EST LOW 20 MIN: CPT | Mod: S$PBB,TH,, | Performed by: OBSTETRICS & GYNECOLOGY

## 2020-08-19 RX ORDER — AMOXICILLIN 500 MG/1
CAPSULE ORAL
COMMUNITY
Start: 2020-08-06 | End: 2020-09-29

## 2020-08-19 NOTE — PROGRESS NOTES
No complaints today   No nausea or vomiting   Denies vaginal bleeding or cramping     Prenatal labs reviewed  Aneuploidy screen  : offered  / declined ( x )    Anatomy US at 20 weeks   Consents=  Diabetes screen=  TdaP=  Growth US=  GBBS=  BC=     General Pregnancy  recommendations given  PNV + H2O intake    FU in 2 (x)    weeks      Ismael Jama M.D.   OB/GYN

## 2020-08-27 ENCOUNTER — HOSPITAL ENCOUNTER (OUTPATIENT)
Facility: HOSPITAL | Age: 30
Discharge: HOME | End: 2020-08-27
Attending: OBSTETRICS & GYNECOLOGY | Admitting: OBSTETRICS & GYNECOLOGY
Payer: MEDICAID

## 2020-08-27 VITALS — TEMPERATURE: 99 F

## 2020-08-27 DIAGNOSIS — O26.899 ABDOMINAL PAIN AFFECTING PREGNANCY: ICD-10-CM

## 2020-08-27 DIAGNOSIS — R10.9 ABDOMINAL PAIN AFFECTING PREGNANCY: ICD-10-CM

## 2020-08-27 LAB
AMORPH CRY URNS QL MICRO: ABNORMAL
BACTERIA #/AREA URNS HPF: ABNORMAL /HPF
BASOPHILS # BLD AUTO: 0 K/UL (ref 0–0.2)
BASOPHILS NFR BLD: 0 % (ref 0–1.9)
BILIRUB UR QL STRIP: NEGATIVE
CLARITY UR: ABNORMAL
COLOR UR: YELLOW
DIFFERENTIAL METHOD: ABNORMAL
EOSINOPHIL # BLD AUTO: 0 K/UL (ref 0–0.5)
EOSINOPHIL NFR BLD: 0.1 % (ref 0–8)
ERYTHROCYTE [DISTWIDTH] IN BLOOD BY AUTOMATED COUNT: 14.4 % (ref 11.5–14.5)
GLUCOSE UR QL STRIP: NEGATIVE
HCT VFR BLD AUTO: 30.7 % (ref 37–48.5)
HGB BLD-MCNC: 9.8 G/DL (ref 12–16)
HGB UR QL STRIP: ABNORMAL
IMM GRANULOCYTES # BLD AUTO: 0.04 K/UL (ref 0–0.04)
IMM GRANULOCYTES NFR BLD AUTO: 0.6 % (ref 0–0.5)
KETONES UR QL STRIP: NEGATIVE
LEUKOCYTE ESTERASE UR QL STRIP: NEGATIVE
LYMPHOCYTES # BLD AUTO: 0.7 K/UL (ref 1–4.8)
LYMPHOCYTES NFR BLD: 9.9 % (ref 18–48)
MCH RBC QN AUTO: 29 PG (ref 27–31)
MCHC RBC AUTO-ENTMCNC: 31.9 G/DL (ref 32–36)
MCV RBC AUTO: 91 FL (ref 82–98)
MICROSCOPIC COMMENT: ABNORMAL
MONOCYTES # BLD AUTO: 0.4 K/UL (ref 0.3–1)
MONOCYTES NFR BLD: 5.8 % (ref 4–15)
NEUTROPHILS # BLD AUTO: 5.6 K/UL (ref 1.8–7.7)
NEUTROPHILS NFR BLD: 83.6 % (ref 38–73)
NITRITE UR QL STRIP: NEGATIVE
NRBC BLD-RTO: 0 /100 WBC
PH UR STRIP: 6 [PH] (ref 5–8)
PLATELET # BLD AUTO: 196 K/UL (ref 150–350)
PMV BLD AUTO: 10 FL (ref 9.2–12.9)
PROT UR QL STRIP: ABNORMAL
RBC # BLD AUTO: 3.38 M/UL (ref 4–5.4)
RBC #/AREA URNS HPF: 10 /HPF (ref 0–4)
SP GR UR STRIP: 1.02 (ref 1–1.03)
SQUAMOUS #/AREA URNS HPF: 3 /HPF
URN SPEC COLLECT METH UR: ABNORMAL
UROBILINOGEN UR STRIP-ACNC: NEGATIVE EU/DL
WBC # BLD AUTO: 6.69 K/UL (ref 3.9–12.7)
WBC #/AREA URNS HPF: 0 /HPF (ref 0–5)

## 2020-08-27 PROCEDURE — 99211 OFF/OP EST MAY X REQ PHY/QHP: CPT | Mod: 25

## 2020-08-27 PROCEDURE — 85025 COMPLETE CBC W/AUTO DIFF WBC: CPT

## 2020-08-27 PROCEDURE — 96372 THER/PROPH/DIAG INJ SC/IM: CPT

## 2020-08-27 PROCEDURE — 36415 COLL VENOUS BLD VENIPUNCTURE: CPT

## 2020-08-27 PROCEDURE — 81000 URINALYSIS NONAUTO W/SCOPE: CPT

## 2020-08-27 PROCEDURE — 87086 URINE CULTURE/COLONY COUNT: CPT

## 2020-08-27 PROCEDURE — 63600175 PHARM REV CODE 636 W HCPCS: Performed by: OBSTETRICS & GYNECOLOGY

## 2020-08-27 RX ORDER — BUTORPHANOL TARTRATE 2 MG/ML
1 INJECTION INTRAMUSCULAR; INTRAVENOUS ONCE
Status: COMPLETED | OUTPATIENT
Start: 2020-08-27 | End: 2020-08-27

## 2020-08-27 RX ORDER — PROMETHAZINE HYDROCHLORIDE 25 MG/ML
25 INJECTION, SOLUTION INTRAMUSCULAR; INTRAVENOUS ONCE
Status: COMPLETED | OUTPATIENT
Start: 2020-08-27 | End: 2020-08-27

## 2020-08-27 RX ADMIN — PROMETHAZINE HYDROCHLORIDE 25 MG: 25 INJECTION INTRAMUSCULAR; INTRAVENOUS at 09:08

## 2020-08-27 RX ADMIN — BUTORPHANOL TARTRATE 1 MG: 2 INJECTION, SOLUTION INTRAMUSCULAR; INTRAVENOUS at 09:08

## 2020-08-27 NOTE — PLAN OF CARE
Pt arrived on unit from er with complaints of left sided flank pain 7/10, Erin used for translation #[id number], pt denies vag bleeding and leaking fluid, she confirms +FM, head to toe assessment completed, explained plan of care.  Sig other at the bedside, call light in reach.

## 2020-08-27 NOTE — PLAN OF CARE
Report given to dr dee by aimee, pt arrived with complaints of left sided flank pain, irregular ctx, reactive strip, 33.3 weeks,  prev vag, ffn collected and held, same with urine, temp 99.0, orders rec

## 2020-08-27 NOTE — DISCHARGE INSTRUCTIONS
EL PARTO PREMATURO    Que esel parto prematuro?  El parto prematuro es el parto que comienza antes de completar las 37 semanas de embarazo.  Lester modesto posria nacer demasiado temprano y sufrir graves problemas de rachele.    Tendra usted un parto prematuro?  Cualquier burton puede tener un partp prematuro, [place] algunas mujeres tienen mas probabilidades que otras.  Candi burton es mas propensa a tener un parto prematuro si georgette:  · Esta embarazada con mellizos o mas bebes  · Tuvo un modesto prematuro en otro embarazo  · Tiene ciertos problemas del utero o kendall del utero    Llame a lester profesional de la rachele de inmediato si tiene alguna de estas senales de advertencia:  · Contracciones que hacen que lester gee se endurezca mucho cada 10 minutos o con mas frecuencia  · Cambio en el color de lester flujo vaginal, o sangrado de la vagina  · La sensacion de que lester modesto esta empujando hacia abajo.  A esto se le llama presion pelvica  · Dolor debil en la parte baja de la espalda  · colicos jacquelyn si tuviera la adela o el periodo  · Dolor de gee con o sin diarrea      Busque Prontamente Atención Médica   si algo de lo siguiente ocurre:   Si tiene menos de 37 semanas y comienza a tener contracciones nuevamente.   Las contracciones son regulares, se tornan más prolongadas, más ousmane y más seguidas (verdadero trabajo de parto).   Fiebre de 100.4°F (38°C) o más erick, o jacquelyn le haya indicado lester proveedor de atención médica.   Le fluye agua o román.   No está robledo si está teniendo un trabajo de parto falso o verdadero.    Temo sobre las contracciones?  · Cuando los musculos del cuerpo se contraen, se tensan o endurecen al tacto.  Lester utero es un musculo.  cuando se contrae, sentira que se tensa y endurece jacquelyn un puno.  No odas las contracciones son dolorosas.  pueden sentirse jacquelyn un colico o dolor en la parte baja de la espalsa.  cuando la contraccion pasa, el utero se ablanda nuevamente.  · Es normal que el utero se contraiga en  ocasiones bree el embarazo.  quizas le suceda cuando recien se acueste, despues de tener relaciones sexuales o despues de subir o bajar escaleras.  · No es normal tener contracciones regulares y frequenes antes de la fecha de parto.  Si  siente contracciones 10 minutos o con mas fracuencia bree kwaku hora (mas de camden contracciones en un hora), significa que el utero se esta contrayendo demasiado.  Llame al profesional de la rachele en liz aranza.        POR QUE CADA SEMANA ES IMPORTANTE    Cada vez mas partos se progaman un poco mas temprano sin justificacion medica.  Los expertos estan descubriendo que esto puede ocasionar problemas tanto para la mama jacquelyn para el modesto.  Si lester embarazo es mary, espere a que el parto comience por si solo.  Muchas cosas importantes le suceden a lester modesto en las ulrimas semanas de embarazo.  Por ejemplo, los pulmones y el cerebro todavia se estan desarrollando.    Si lester embarazo es mary y esta planeando programar el nacimiento de lester modesto, le conviene mantener el embrazo bree al menos 39 semanas.  Los bebes que nacen muy temprano pueden tener mas tarde en la samantha que los bebes que nacen mas tarde.  Permanecer embarazada por 39 semanas, le humberto al cuerpo del modesto todo el tiempo que necesita para crecer.    Estos son los motivos por los que lester modesto necesita las 39 semanas:  · Los organos importantes jacquelyn el cerebro, los pulmones y el higado tienen la cantidad de tiempo que necesitan para desarrollarse.  · Existen menos probabilidades de que el modesto tenga problemas de la vista y audicion despues de nacer.  · El modesto tiene tiempo para aumentar de peso en el utero.  Los bebes que nacen con un peso mary no tienen tantas dificultades para mantener la temperatura corpal jacquelyn los bebes que nacen muy pequenos.  · El modesto podra succionar, tragar y permanecer despierto lo suficiente para comer despues de nacer.  Los bebes que nacen temprano a veces no pueden hacer estas  cosas.                                                     RECUENTO DE PATADAS      Recuento de patadas   Es normal que le preocupe la rachele de lester bebé. Para saber si el bebé está ora, usted puede anotar las veces que usted siente elena pataditas en un registro de movimientos todos los días. Normalmente es posible sentir que el bebé se mueve a partir de la semana 20 del embarazo. No olvide llevar los registros de los movimientos del bebé a todas las citas que tenga con lester proveedor de atención médica.     Cómo contar los movimientos   Escoja kwaku hora cuando el bebé esté activo, jacquelyn por ejemplo después de kwaku comida.   Siéntese cómodamente o acuéstese de lado.   La primera vez que el bebé se mueva, anote la hora.   Cuente cada movimiento hasta que el bebé se haya movido 10 veces. ([place] puede llevar entre 20 minutos y 2 horas.)   Si el bebé no se ha movido 4 veces en 1 hora, dése kwaku palmadita en el abdomen para despertarlo.   Anote la hora en que sienta el décimo movimiento del bebé.   Trate de hacer esto a la misma hora todos los días.    Cuándo debe llamar al proveedor de atención médica     Llame a lester proveedor de atención médica en el acto en cualquiera de los siguientes casos:   Si el bebé se mueve menos de 10 veces en 4 horas mientras usted está llevando la cuenta de las pataditas.   Si el bebé se mueve con mucha menos frecuencia que en días anteriores.   Si usted no ha sentido movimientos del bebé en todo el día.      TIEMPO JESUS DE PATADAS

## 2020-08-27 NOTE — PLAN OF CARE
Report given to Dr. Jama, pt rec stadol and phenergan, irregular ctx, ua results rev, pt is to strain her urine and see him at next scheduled appt.

## 2020-08-28 LAB
BACTERIA UR CULT: NORMAL
BACTERIA UR CULT: NORMAL

## 2020-09-02 ENCOUNTER — TELEPHONE (OUTPATIENT)
Dept: OBSTETRICS AND GYNECOLOGY | Facility: CLINIC | Age: 30
End: 2020-09-02

## 2020-09-02 NOTE — TELEPHONE ENCOUNTER
----- Message from Teresita Jessica sent at 9/2/2020  1:35 PM CDT -----  Regarding: call back  Contact: [phone]  Patient is calling to talk to nurse in regards to running late she will take about 30 minutes. Please advice

## 2020-09-04 ENCOUNTER — ROUTINE PRENATAL (OUTPATIENT)
Dept: OBSTETRICS AND GYNECOLOGY | Facility: CLINIC | Age: 30
End: 2020-09-04
Payer: MEDICAID

## 2020-09-04 VITALS — BODY MASS INDEX: 27.7 KG/M2 | DIASTOLIC BLOOD PRESSURE: 68 MMHG | WEIGHT: 146.63 LBS | SYSTOLIC BLOOD PRESSURE: 100 MMHG

## 2020-09-04 DIAGNOSIS — Z3A.36 36 WEEKS GESTATION OF PREGNANCY: Primary | ICD-10-CM

## 2020-09-04 DIAGNOSIS — Z34.83 ENCOUNTER FOR SUPERVISION OF OTHER NORMAL PREGNANCY IN THIRD TRIMESTER: ICD-10-CM

## 2020-09-04 DIAGNOSIS — N20.9 CALCULUS OF UPPER URINARY TRACT: ICD-10-CM

## 2020-09-04 PROCEDURE — 99213 OFFICE O/P EST LOW 20 MIN: CPT | Mod: S$PBB,TH,, | Performed by: OBSTETRICS & GYNECOLOGY

## 2020-09-04 PROCEDURE — 99999 PR PBB SHADOW E&M-EST. PATIENT-LVL II: ICD-10-PCS | Mod: PBBFAC,,, | Performed by: OBSTETRICS & GYNECOLOGY

## 2020-09-04 PROCEDURE — 99999 PR PBB SHADOW E&M-EST. PATIENT-LVL II: CPT | Mod: PBBFAC,,, | Performed by: OBSTETRICS & GYNECOLOGY

## 2020-09-04 PROCEDURE — 99213 PR OFFICE/OUTPT VISIT, EST, LEVL III, 20-29 MIN: ICD-10-PCS | Mod: S$PBB,TH,, | Performed by: OBSTETRICS & GYNECOLOGY

## 2020-09-04 PROCEDURE — 99212 OFFICE O/P EST SF 10 MIN: CPT | Mod: PBBFAC,TH,PO | Performed by: OBSTETRICS & GYNECOLOGY

## 2020-09-04 NOTE — PROGRESS NOTES
No complaints today   No nausea or vomiting   Denies vaginal bleeding or cramping     Prenatal labs reviewed  Aneuploidy screen  : offered  / declined ( x )    Anatomy US at 20 weeks   Consents= signed   Diabetes screen=  TdaP=given  Growth US= scheduled   GBBS=  BC=     General Pregnancy  recommendations given  PNV + H2O intake    FU in 2 (x)    weeks      Ismael Jama M.D.   OB/GYN

## 2020-09-21 ENCOUNTER — PROCEDURE VISIT (OUTPATIENT)
Dept: OBSTETRICS AND GYNECOLOGY | Facility: CLINIC | Age: 30
End: 2020-09-21
Payer: MEDICAID

## 2020-09-21 ENCOUNTER — ROUTINE PRENATAL (OUTPATIENT)
Dept: OBSTETRICS AND GYNECOLOGY | Facility: CLINIC | Age: 30
End: 2020-09-21
Payer: MEDICAID

## 2020-09-21 VITALS — SYSTOLIC BLOOD PRESSURE: 98 MMHG | BODY MASS INDEX: 28.16 KG/M2 | WEIGHT: 149.06 LBS | DIASTOLIC BLOOD PRESSURE: 60 MMHG

## 2020-09-21 DIAGNOSIS — Z36.89 ENCOUNTER FOR ULTRASOUND TO CHECK FETAL GROWTH: ICD-10-CM

## 2020-09-21 DIAGNOSIS — Z34.83 ENCOUNTER FOR SUPERVISION OF OTHER NORMAL PREGNANCY IN THIRD TRIMESTER: ICD-10-CM

## 2020-09-21 DIAGNOSIS — Z3A.37 37 WEEKS GESTATION OF PREGNANCY: Primary | ICD-10-CM

## 2020-09-21 DIAGNOSIS — Z3A.36 36 WEEKS GESTATION OF PREGNANCY: ICD-10-CM

## 2020-09-21 PROCEDURE — 99212 OFFICE O/P EST SF 10 MIN: CPT | Mod: PBBFAC,TH,PO,25 | Performed by: OBSTETRICS & GYNECOLOGY

## 2020-09-21 PROCEDURE — 76816 OB US FOLLOW-UP PER FETUS: CPT | Mod: 26,S$PBB,, | Performed by: OBSTETRICS & GYNECOLOGY

## 2020-09-21 PROCEDURE — 87081 CULTURE SCREEN ONLY: CPT

## 2020-09-21 PROCEDURE — 99213 OFFICE O/P EST LOW 20 MIN: CPT | Mod: S$PBB,TH,, | Performed by: OBSTETRICS & GYNECOLOGY

## 2020-09-21 PROCEDURE — 99999 PR PBB SHADOW E&M-EST. PATIENT-LVL II: CPT | Mod: PBBFAC,,, | Performed by: OBSTETRICS & GYNECOLOGY

## 2020-09-21 PROCEDURE — 99999 PR PBB SHADOW E&M-EST. PATIENT-LVL II: ICD-10-PCS | Mod: PBBFAC,,, | Performed by: OBSTETRICS & GYNECOLOGY

## 2020-09-21 PROCEDURE — 76816 OB US FOLLOW-UP PER FETUS: CPT | Mod: PBBFAC,PO | Performed by: OBSTETRICS & GYNECOLOGY

## 2020-09-21 PROCEDURE — 76816 PR  US,PREGNANT UTERUS,F/U,TRANSABD APP: ICD-10-PCS | Mod: 26,S$PBB,, | Performed by: OBSTETRICS & GYNECOLOGY

## 2020-09-21 PROCEDURE — 99213 PR OFFICE/OUTPT VISIT, EST, LEVL III, 20-29 MIN: ICD-10-PCS | Mod: S$PBB,TH,, | Performed by: OBSTETRICS & GYNECOLOGY

## 2020-09-21 NOTE — PROGRESS NOTES
No complaints today   No nausea or vomiting   Denies vaginal bleeding or cramping     Prenatal labs reviewed  Aneuploidy screen  : offered  / declined ( x )    Anatomy US at 20 weeks   Consents= signed   Diabetes screen=  TdaP=given  Growth US= scheduled   GBBS=  BC=     General Pregnancy  recommendations given  PNV + H2O intake    FU in 1 (x)    weeks      Ismael Jama M.D.   OB/GYN

## 2020-09-24 LAB — BACTERIA SPEC AEROBE CULT: NORMAL

## 2020-09-29 ENCOUNTER — ROUTINE PRENATAL (OUTPATIENT)
Dept: OBSTETRICS AND GYNECOLOGY | Facility: CLINIC | Age: 30
End: 2020-09-29
Payer: MEDICAID

## 2020-09-29 VITALS
SYSTOLIC BLOOD PRESSURE: 102 MMHG | DIASTOLIC BLOOD PRESSURE: 58 MMHG | BODY MASS INDEX: 28.12 KG/M2 | WEIGHT: 148.81 LBS

## 2020-09-29 DIAGNOSIS — Z3A.38 38 WEEKS GESTATION OF PREGNANCY: Primary | ICD-10-CM

## 2020-09-29 DIAGNOSIS — Z34.83 ENCOUNTER FOR SUPERVISION OF OTHER NORMAL PREGNANCY IN THIRD TRIMESTER: ICD-10-CM

## 2020-09-29 PROCEDURE — 99999 PR PBB SHADOW E&M-EST. PATIENT-LVL II: ICD-10-PCS | Mod: PBBFAC,,, | Performed by: OBSTETRICS & GYNECOLOGY

## 2020-09-29 PROCEDURE — 99213 OFFICE O/P EST LOW 20 MIN: CPT | Mod: S$PBB,TH,, | Performed by: OBSTETRICS & GYNECOLOGY

## 2020-09-29 PROCEDURE — 99999 PR PBB SHADOW E&M-EST. PATIENT-LVL II: CPT | Mod: PBBFAC,,, | Performed by: OBSTETRICS & GYNECOLOGY

## 2020-09-29 PROCEDURE — 99212 OFFICE O/P EST SF 10 MIN: CPT | Mod: PBBFAC,PO | Performed by: OBSTETRICS & GYNECOLOGY

## 2020-09-29 PROCEDURE — 99213 PR OFFICE/OUTPT VISIT, EST, LEVL III, 20-29 MIN: ICD-10-PCS | Mod: S$PBB,TH,, | Performed by: OBSTETRICS & GYNECOLOGY

## 2020-09-29 RX ORDER — OXYTOCIN/RINGER'S LACTATE 30/500 ML
334 PLASTIC BAG, INJECTION (ML) INTRAVENOUS ONCE
Status: CANCELLED | OUTPATIENT
Start: 2020-09-29 | End: 2020-09-29

## 2020-09-29 RX ORDER — CALCIUM CARBONATE 200(500)MG
500 TABLET,CHEWABLE ORAL 3 TIMES DAILY PRN
Status: CANCELLED | OUTPATIENT
Start: 2020-09-29

## 2020-09-29 RX ORDER — CARBOPROST TROMETHAMINE 250 UG/ML
250 INJECTION, SOLUTION INTRAMUSCULAR
Status: CANCELLED | OUTPATIENT
Start: 2020-09-29

## 2020-09-29 RX ORDER — OXYTOCIN/RINGER'S LACTATE 30/500 ML
2 PLASTIC BAG, INJECTION (ML) INTRAVENOUS CONTINUOUS
Status: CANCELLED | OUTPATIENT
Start: 2020-09-29

## 2020-09-29 RX ORDER — METHYLERGONOVINE MALEATE 0.2 MG/ML
200 INJECTION INTRAVENOUS
Status: CANCELLED | OUTPATIENT
Start: 2020-09-29

## 2020-09-29 RX ORDER — OXYTOCIN/RINGER'S LACTATE 30/500 ML
95 PLASTIC BAG, INJECTION (ML) INTRAVENOUS ONCE
Status: CANCELLED | OUTPATIENT
Start: 2020-09-29 | End: 2020-09-29

## 2020-09-29 RX ORDER — SODIUM CHLORIDE 9 MG/ML
INJECTION, SOLUTION INTRAVENOUS
Status: CANCELLED | OUTPATIENT
Start: 2020-09-29

## 2020-09-29 RX ORDER — ONDANSETRON 4 MG/1
8 TABLET, ORALLY DISINTEGRATING ORAL EVERY 8 HOURS PRN
Status: CANCELLED | OUTPATIENT
Start: 2020-09-29

## 2020-09-29 RX ORDER — SODIUM CHLORIDE 9 MG/ML
INJECTION, SOLUTION INTRAVENOUS CONTINUOUS
Status: CANCELLED | OUTPATIENT
Start: 2020-09-29

## 2020-09-29 RX ORDER — SIMETHICONE 80 MG
1 TABLET,CHEWABLE ORAL 4 TIMES DAILY PRN
Status: CANCELLED | OUTPATIENT
Start: 2020-09-29

## 2020-09-29 RX ORDER — MISOPROSTOL 100 UG/1
800 TABLET ORAL
Status: CANCELLED | OUTPATIENT
Start: 2020-09-29

## 2020-09-29 RX ORDER — SODIUM CHLORIDE, SODIUM LACTATE, POTASSIUM CHLORIDE, CALCIUM CHLORIDE 600; 310; 30; 20 MG/100ML; MG/100ML; MG/100ML; MG/100ML
INJECTION, SOLUTION INTRAVENOUS CONTINUOUS
Status: CANCELLED | OUTPATIENT
Start: 2020-09-29

## 2020-09-29 NOTE — PROGRESS NOTES
No complaints today   No nausea or vomiting   Denies vaginal bleeding or cramping     Prenatal labs reviewed  Aneuploidy screen  : offered  / declined ( x )    Anatomy US at 20 weeks   Consents= signed   Diabetes screen=  TdaP=given  Growth US= scheduled   GBBS=  BC=     General Pregnancy  recommendations given  PNV + H2O intake    IOL  10/6/20      Ismael Jama M.D.   OB/GYN

## 2020-10-06 ENCOUNTER — HOSPITAL ENCOUNTER (INPATIENT)
Facility: HOSPITAL | Age: 30
LOS: 3 days | Discharge: HOME | End: 2020-10-09
Attending: OBSTETRICS & GYNECOLOGY | Admitting: OBSTETRICS & GYNECOLOGY
Payer: MEDICAID

## 2020-10-06 DIAGNOSIS — Z3A.38 38 WEEKS GESTATION OF PREGNANCY: ICD-10-CM

## 2020-10-06 DIAGNOSIS — Z3A.39 39 WEEKS GESTATION OF PREGNANCY: ICD-10-CM

## 2020-10-06 LAB
ABO + RH BLD: NORMAL
ALBUMIN SERPL BCP-MCNC: 2.4 G/DL (ref 3.5–5.2)
ALP SERPL-CCNC: 297 U/L (ref 55–135)
ALT SERPL W/O P-5'-P-CCNC: 13 U/L (ref 10–44)
ANION GAP SERPL CALC-SCNC: 9 MMOL/L (ref 8–16)
AST SERPL-CCNC: 13 U/L (ref 10–40)
BASOPHILS # BLD AUTO: 0.01 K/UL (ref 0–0.2)
BASOPHILS NFR BLD: 0.2 % (ref 0–1.9)
BILIRUB SERPL-MCNC: 0.2 MG/DL (ref 0.1–1)
BLD GP AB SCN CELLS X3 SERPL QL: NORMAL
BUN SERPL-MCNC: 6 MG/DL (ref 6–20)
CALCIUM SERPL-MCNC: 8.1 MG/DL (ref 8.7–10.5)
CHLORIDE SERPL-SCNC: 109 MMOL/L (ref 95–110)
CO2 SERPL-SCNC: 20 MMOL/L (ref 23–29)
CREAT SERPL-MCNC: 0.5 MG/DL (ref 0.5–1.4)
DIFFERENTIAL METHOD: ABNORMAL
EOSINOPHIL # BLD AUTO: 0 K/UL (ref 0–0.5)
EOSINOPHIL NFR BLD: 0.2 % (ref 0–8)
ERYTHROCYTE [DISTWIDTH] IN BLOOD BY AUTOMATED COUNT: 15.2 % (ref 11.5–14.5)
EST. GFR  (AFRICAN AMERICAN): >60 ML/MIN/1.73 M^2
EST. GFR  (NON AFRICAN AMERICAN): >60 ML/MIN/1.73 M^2
GLUCOSE SERPL-MCNC: 100 MG/DL (ref 70–110)
HCT VFR BLD AUTO: 28.5 % (ref 37–48.5)
HGB BLD-MCNC: 8.8 G/DL (ref 12–16)
HIV1+2 IGG SERPL QL IA.RAPID: NORMAL
IMM GRANULOCYTES # BLD AUTO: 0.01 K/UL (ref 0–0.04)
IMM GRANULOCYTES NFR BLD AUTO: 0.2 % (ref 0–0.5)
LYMPHOCYTES # BLD AUTO: 1.4 K/UL (ref 1–4.8)
LYMPHOCYTES NFR BLD: 27.2 % (ref 18–48)
MCH RBC QN AUTO: 26.2 PG (ref 27–31)
MCHC RBC AUTO-ENTMCNC: 30.9 G/DL (ref 32–36)
MCV RBC AUTO: 85 FL (ref 82–98)
MONOCYTES # BLD AUTO: 0.5 K/UL (ref 0.3–1)
MONOCYTES NFR BLD: 9.5 % (ref 4–15)
NEUTROPHILS # BLD AUTO: 3.3 K/UL (ref 1.8–7.7)
NEUTROPHILS NFR BLD: 62.7 % (ref 38–73)
NRBC BLD-RTO: 0 /100 WBC
PLATELET # BLD AUTO: 218 K/UL (ref 150–350)
PMV BLD AUTO: 10.6 FL (ref 9.2–12.9)
POTASSIUM SERPL-SCNC: 3.6 MMOL/L (ref 3.5–5.1)
PROT SERPL-MCNC: 6 G/DL (ref 6–8.4)
RBC # BLD AUTO: 3.36 M/UL (ref 4–5.4)
SARS-COV-2 RDRP RESP QL NAA+PROBE: NEGATIVE
SODIUM SERPL-SCNC: 138 MMOL/L (ref 136–145)
WBC # BLD AUTO: 5.26 K/UL (ref 3.9–12.7)

## 2020-10-06 PROCEDURE — 63600175 PHARM REV CODE 636 W HCPCS: Performed by: OBSTETRICS & GYNECOLOGY

## 2020-10-06 PROCEDURE — 87340 HEPATITIS B SURFACE AG IA: CPT

## 2020-10-06 PROCEDURE — 11000001 HC ACUTE MED/SURG PRIVATE ROOM

## 2020-10-06 PROCEDURE — 86901 BLOOD TYPING SEROLOGIC RH(D): CPT

## 2020-10-06 PROCEDURE — 85025 COMPLETE CBC W/AUTO DIFF WBC: CPT

## 2020-10-06 PROCEDURE — 72100002 HC LABOR CARE, 1ST 8 HOURS

## 2020-10-06 PROCEDURE — 80053 COMPREHEN METABOLIC PANEL: CPT

## 2020-10-06 PROCEDURE — 86592 SYPHILIS TEST NON-TREP QUAL: CPT

## 2020-10-06 PROCEDURE — U0002 COVID-19 LAB TEST NON-CDC: HCPCS

## 2020-10-06 PROCEDURE — 86703 HIV-1/HIV-2 1 RESULT ANTBDY: CPT | Mod: 91

## 2020-10-06 PROCEDURE — 36415 COLL VENOUS BLD VENIPUNCTURE: CPT

## 2020-10-06 PROCEDURE — 86703 HIV-1/HIV-2 1 RESULT ANTBDY: CPT

## 2020-10-06 RX ORDER — SODIUM CHLORIDE, SODIUM LACTATE, POTASSIUM CHLORIDE, CALCIUM CHLORIDE 600; 310; 30; 20 MG/100ML; MG/100ML; MG/100ML; MG/100ML
INJECTION, SOLUTION INTRAVENOUS CONTINUOUS
Status: DISCONTINUED | OUTPATIENT
Start: 2020-10-06 | End: 2020-10-07

## 2020-10-06 RX ORDER — SODIUM CHLORIDE 9 MG/ML
INJECTION, SOLUTION INTRAVENOUS
Status: DISCONTINUED | OUTPATIENT
Start: 2020-10-06 | End: 2020-10-07

## 2020-10-06 RX ORDER — SIMETHICONE 80 MG
1 TABLET,CHEWABLE ORAL 4 TIMES DAILY PRN
Status: DISCONTINUED | OUTPATIENT
Start: 2020-10-06 | End: 2020-10-07

## 2020-10-06 RX ORDER — OXYTOCIN/RINGER'S LACTATE 30/500 ML
95 PLASTIC BAG, INJECTION (ML) INTRAVENOUS ONCE
Status: DISCONTINUED | OUTPATIENT
Start: 2020-10-06 | End: 2020-10-07

## 2020-10-06 RX ORDER — METHYLERGONOVINE MALEATE 0.2 MG/ML
200 INJECTION INTRAVENOUS
Status: DISCONTINUED | OUTPATIENT
Start: 2020-10-06 | End: 2020-10-07

## 2020-10-06 RX ORDER — CARBOPROST TROMETHAMINE 250 UG/ML
250 INJECTION, SOLUTION INTRAMUSCULAR
Status: DISCONTINUED | OUTPATIENT
Start: 2020-10-06 | End: 2020-10-07

## 2020-10-06 RX ORDER — MISOPROSTOL 200 UG/1
800 TABLET ORAL
Status: DISCONTINUED | OUTPATIENT
Start: 2020-10-06 | End: 2020-10-07

## 2020-10-06 RX ORDER — OXYTOCIN/RINGER'S LACTATE 30/500 ML
2 PLASTIC BAG, INJECTION (ML) INTRAVENOUS CONTINUOUS
Status: DISCONTINUED | OUTPATIENT
Start: 2020-10-06 | End: 2020-10-07

## 2020-10-06 RX ORDER — CALCIUM CARBONATE 200(500)MG
500 TABLET,CHEWABLE ORAL 3 TIMES DAILY PRN
Status: DISCONTINUED | OUTPATIENT
Start: 2020-10-06 | End: 2020-10-07

## 2020-10-06 RX ORDER — ONDANSETRON 8 MG/1
8 TABLET, ORALLY DISINTEGRATING ORAL EVERY 8 HOURS PRN
Status: DISCONTINUED | OUTPATIENT
Start: 2020-10-06 | End: 2020-10-07

## 2020-10-06 RX ADMIN — SODIUM CHLORIDE, SODIUM LACTATE, POTASSIUM CHLORIDE, AND CALCIUM CHLORIDE 999 ML: .6; .31; .03; .02 INJECTION, SOLUTION INTRAVENOUS at 09:10

## 2020-10-06 RX ADMIN — SODIUM CHLORIDE, SODIUM LACTATE, POTASSIUM CHLORIDE, AND CALCIUM CHLORIDE: .6; .31; .03; .02 INJECTION, SOLUTION INTRAVENOUS at 10:10

## 2020-10-07 ENCOUNTER — ANESTHESIA EVENT (OUTPATIENT)
Dept: OBSTETRICS AND GYNECOLOGY | Facility: HOSPITAL | Age: 30
End: 2020-10-07
Payer: MEDICAID

## 2020-10-07 ENCOUNTER — ANESTHESIA (OUTPATIENT)
Dept: OBSTETRICS AND GYNECOLOGY | Facility: HOSPITAL | Age: 30
End: 2020-10-07
Payer: MEDICAID

## 2020-10-07 PROBLEM — Z3A.38 38 WEEKS GESTATION OF PREGNANCY: Status: RESOLVED | Noted: 2020-10-06 | Resolved: 2020-10-07

## 2020-10-07 PROBLEM — Z3A.39 39 WEEKS GESTATION OF PREGNANCY: Status: ACTIVE | Noted: 2020-10-07

## 2020-10-07 PROBLEM — Z3A.38 38 WEEKS GESTATION OF PREGNANCY: Status: ACTIVE | Noted: 2020-10-07

## 2020-10-07 LAB
HBV SURFACE AG SERPL QL IA: NEGATIVE
HIV 1+2 AB+HIV1 P24 AG SERPL QL IA: NEGATIVE
RPR SER QL: NORMAL

## 2020-10-07 PROCEDURE — 25000003 PHARM REV CODE 250: Performed by: OBSTETRICS & GYNECOLOGY

## 2020-10-07 PROCEDURE — 63600175 PHARM REV CODE 636 W HCPCS: Performed by: OBSTETRICS & GYNECOLOGY

## 2020-10-07 PROCEDURE — 72100003 HC LABOR CARE, EA. ADDL. 8 HRS

## 2020-10-07 PROCEDURE — 62326 NJX INTERLAMINAR LMBR/SAC: CPT | Performed by: STUDENT IN AN ORGANIZED HEALTH CARE EDUCATION/TRAINING PROGRAM

## 2020-10-07 PROCEDURE — 59409 PR OBSTETRICAL CARE,VAG DELIV ONLY: ICD-10-PCS | Mod: GB,,, | Performed by: OBSTETRICS & GYNECOLOGY

## 2020-10-07 PROCEDURE — 11000001 HC ACUTE MED/SURG PRIVATE ROOM

## 2020-10-07 PROCEDURE — 25000003 PHARM REV CODE 250: Performed by: STUDENT IN AN ORGANIZED HEALTH CARE EDUCATION/TRAINING PROGRAM

## 2020-10-07 PROCEDURE — 51702 INSERT TEMP BLADDER CATH: CPT

## 2020-10-07 PROCEDURE — 59409 OBSTETRICAL CARE: CPT | Mod: GB,,, | Performed by: OBSTETRICS & GYNECOLOGY

## 2020-10-07 PROCEDURE — 27000181 HC CABLE, IUPC

## 2020-10-07 PROCEDURE — 72200005 HC VAGINAL DELIVERY LEVEL II

## 2020-10-07 PROCEDURE — 27200710 HC EPIDURAL INFUSION PUMP SET: Performed by: ANESTHESIOLOGY

## 2020-10-07 PROCEDURE — 27800516 HC TRAY, EPIDURAL COMBO: Performed by: ANESTHESIOLOGY

## 2020-10-07 RX ORDER — SODIUM CHLORIDE 9 MG/ML
INJECTION, SOLUTION INTRAVENOUS CONTINUOUS
Status: DISCONTINUED | OUTPATIENT
Start: 2020-10-07 | End: 2020-10-09 | Stop reason: HOSPADM

## 2020-10-07 RX ORDER — OXYTOCIN/RINGER'S LACTATE 30/500 ML
95 PLASTIC BAG, INJECTION (ML) INTRAVENOUS ONCE
Status: DISCONTINUED | OUTPATIENT
Start: 2020-10-07 | End: 2020-10-09 | Stop reason: HOSPADM

## 2020-10-07 RX ORDER — FENTANYL/ROPIVACAINE/NS/PF 2MCG/ML-.2
PLASTIC BAG, INJECTION (ML) INJECTION
Status: COMPLETED
Start: 2020-10-07 | End: 2020-10-07

## 2020-10-07 RX ORDER — NAPROXEN 500 MG/1
500 TABLET ORAL EVERY 8 HOURS PRN
Status: DISCONTINUED | OUTPATIENT
Start: 2020-10-07 | End: 2020-10-09 | Stop reason: HOSPADM

## 2020-10-07 RX ORDER — SODIUM CITRATE AND CITRIC ACID MONOHYDRATE 334; 500 MG/5ML; MG/5ML
30 SOLUTION ORAL ONCE
Status: DISCONTINUED | OUTPATIENT
Start: 2020-10-07 | End: 2020-10-07

## 2020-10-07 RX ORDER — OXYTOCIN/RINGER'S LACTATE 30/500 ML
334 PLASTIC BAG, INJECTION (ML) INTRAVENOUS ONCE
Status: COMPLETED | OUTPATIENT
Start: 2020-10-07 | End: 2020-10-07

## 2020-10-07 RX ORDER — ACETAMINOPHEN 325 MG/1
650 TABLET ORAL EVERY 6 HOURS PRN
Status: DISCONTINUED | OUTPATIENT
Start: 2020-10-07 | End: 2020-10-09 | Stop reason: HOSPADM

## 2020-10-07 RX ORDER — DOCUSATE SODIUM 100 MG/1
200 CAPSULE, LIQUID FILLED ORAL 2 TIMES DAILY PRN
Status: DISCONTINUED | OUTPATIENT
Start: 2020-10-07 | End: 2020-10-09 | Stop reason: HOSPADM

## 2020-10-07 RX ORDER — OXYTOCIN/RINGER'S LACTATE 30/500 ML
95 PLASTIC BAG, INJECTION (ML) INTRAVENOUS ONCE
Status: COMPLETED | OUTPATIENT
Start: 2020-10-07 | End: 2020-10-07

## 2020-10-07 RX ORDER — SIMETHICONE 80 MG
1 TABLET,CHEWABLE ORAL EVERY 6 HOURS PRN
Status: DISCONTINUED | OUTPATIENT
Start: 2020-10-07 | End: 2020-10-09 | Stop reason: HOSPADM

## 2020-10-07 RX ORDER — DIPHENHYDRAMINE HCL 25 MG
25 CAPSULE ORAL EVERY 4 HOURS PRN
Status: DISCONTINUED | OUTPATIENT
Start: 2020-10-07 | End: 2020-10-09 | Stop reason: HOSPADM

## 2020-10-07 RX ORDER — FENTANYL/ROPIVACAINE/NS/PF 2MCG/ML-.2
PLASTIC BAG, INJECTION (ML) INJECTION CONTINUOUS PRN
Status: DISCONTINUED | OUTPATIENT
Start: 2020-10-07 | End: 2020-10-07

## 2020-10-07 RX ORDER — OXYCODONE AND ACETAMINOPHEN 5; 325 MG/1; MG/1
1 TABLET ORAL EVERY 4 HOURS PRN
Status: DISCONTINUED | OUTPATIENT
Start: 2020-10-07 | End: 2020-10-09 | Stop reason: HOSPADM

## 2020-10-07 RX ORDER — MUPIROCIN 20 MG/G
OINTMENT TOPICAL 2 TIMES DAILY
Status: DISCONTINUED | OUTPATIENT
Start: 2020-10-07 | End: 2020-10-07

## 2020-10-07 RX ORDER — NALOXONE HCL 0.4 MG/ML
0.02 VIAL (ML) INJECTION
Status: DISCONTINUED | OUTPATIENT
Start: 2020-10-07 | End: 2020-10-07

## 2020-10-07 RX ORDER — PRENATAL WITH FERROUS FUM AND FOLIC ACID 3080; 920; 120; 400; 22; 1.84; 3; 20; 10; 1; 12; 200; 27; 25; 2 [IU]/1; [IU]/1; MG/1; [IU]/1; MG/1; MG/1; MG/1; MG/1; MG/1; MG/1; UG/1; MG/1; MG/1; MG/1; MG/1
1 TABLET ORAL DAILY
Status: DISCONTINUED | OUTPATIENT
Start: 2020-10-07 | End: 2020-10-09 | Stop reason: HOSPADM

## 2020-10-07 RX ORDER — DIPHENHYDRAMINE HYDROCHLORIDE 50 MG/ML
25 INJECTION INTRAMUSCULAR; INTRAVENOUS EVERY 4 HOURS PRN
Status: DISCONTINUED | OUTPATIENT
Start: 2020-10-07 | End: 2020-10-09 | Stop reason: HOSPADM

## 2020-10-07 RX ORDER — ONDANSETRON 8 MG/1
8 TABLET, ORALLY DISINTEGRATING ORAL EVERY 8 HOURS PRN
Status: DISCONTINUED | OUTPATIENT
Start: 2020-10-07 | End: 2020-10-09 | Stop reason: HOSPADM

## 2020-10-07 RX ORDER — FAMOTIDINE 10 MG/ML
20 INJECTION INTRAVENOUS ONCE
Status: DISCONTINUED | OUTPATIENT
Start: 2020-10-07 | End: 2020-10-07

## 2020-10-07 RX ADMIN — CALCIUM CARBONATE (ANTACID) CHEW TAB 500 MG 500 MG: 500 CHEW TAB at 05:10

## 2020-10-07 RX ADMIN — SODIUM CHLORIDE, SODIUM LACTATE, POTASSIUM CHLORIDE, AND CALCIUM CHLORIDE: .6; .31; .03; .02 INJECTION, SOLUTION INTRAVENOUS at 07:10

## 2020-10-07 RX ADMIN — SODIUM CHLORIDE, SODIUM LACTATE, POTASSIUM CHLORIDE, AND CALCIUM CHLORIDE 700 ML: .6; .31; .03; .02 INJECTION, SOLUTION INTRAVENOUS at 04:10

## 2020-10-07 RX ADMIN — CALCIUM CARBONATE (ANTACID) CHEW TAB 500 MG 500 MG: 500 CHEW TAB at 07:10

## 2020-10-07 RX ADMIN — Medication 2 MILLI-UNITS/MIN: at 03:10

## 2020-10-07 RX ADMIN — FENTANYL CIT 0.2 MG/100ML-ROPIV 0.2%-NACL 0.9% EPIDURAL INJ 12 ML/HR: 2/0.2 SOLUTION at 05:10

## 2020-10-07 RX ADMIN — Medication 95 MILLI-UNITS/MIN: at 11:10

## 2020-10-07 RX ADMIN — NAPROXEN 500 MG: 500 TABLET ORAL at 03:10

## 2020-10-07 RX ADMIN — Medication 334 MILLI-UNITS/MIN: at 10:10

## 2020-10-07 NOTE — NURSING
Caring for 30 year old  IOL, currently 7cm/50/-2 with AROM clear at 0830 per Dr. Jama, IUPC placed.  Martii used for interpretation (#[id number]). Epidural providing complete pain relief. Positioned to left side, peanut ball under right knee. [place] draining clear yellow urine. FHR moderate variability, early decels noted. MVUs currently aprox 100, will increase Pitocin as fetus tolerates.  at bedside, patient relaxed and comfortable, no distress noted.

## 2020-10-07 NOTE — NURSING
Dr. Jama called unit. Updated MD pt arrived to unit for IOL, SVE:4/60/-3, with reactive NST and irregular ctxs. MD stated to start pitocin at 3am.

## 2020-10-07 NOTE — ANESTHESIA PREPROCEDURE EVALUATION
10/07/2020  Saadia Gayle is a 30 y.o., female   requesting CSE for Labor Analgesia    Past Medical History:   Diagnosis Date    Hypertension in pregnancy 2014     Patient Active Problem List   Diagnosis    Proteinuria affecting pregnancy in third trimester    Supervision of normal pregnancy    Abdominal pain affecting pregnancy    38 weeks gestation of pregnancy       Review of patient's allergies indicates:   Allergen Reactions    Penicillins Hives and Shortness Of Breath    Amoxicillin        Past Surgical History:   Procedure Laterality Date    DILATION AND CURETTAGE OF UTERUS      SAB     Recent Labs   Lab 10/06/20  2232   WBC 5.26   RBC 3.36*   HGB 8.8*   HCT 28.5*      MCV 85   MCH 26.2*   MCHC 30.9*     Current Facility-Administered Medications on File Prior to Visit   Medication Dose Route Frequency Provider Last Rate Last Dose    0.9%  NaCl infusion   Intra-Catheter PRN Ismael Jama MD        calcium carbonate 200 mg calcium (500 mg) chewable tablet 500 mg  500 mg Oral TID PRN Ismael Jama MD        carboprost injection 250 mcg  250 mcg Intramuscular PRN Ismael Jama MD        fentanyl-ropivacaine-NaCl (PF) 2-0.2 mcg/ml-% 2-0.2 mcg/mL-% epidural Soln             lactated ringers bolus 1,000 mL  1,000 mL Intravenous PRN Ismael Jama MD        lactated ringers infusion   Intravenous Continuous Ismael Jama  mL/hr at 10/07/20 0436      methylergonovine injection 200 mcg  200 mcg Intramuscular PRN Ismael Jama MD        miSOPROStoL tablet 800 mcg  800 mcg Rectal PRN Ismael Jama MD        ondansetron disintegrating tablet 8 mg  8 mg Oral Q8H PRN Ismael Jama MD        oxytocin 30 units in 500 mL lactated ringers infusion (non-titrating)  95 emiliana-units/min Intravenous Once Ismael Jama MD        oxytocin  30 units in 500 mL lactated ringers infusion (titrating)  2 emiliana-units/min Intravenous Continuous Ismael Jama MD 6 mL/hr at 10/07/20 0521 6 emiliana-units/min at 10/07/20 0521    promethazine (PHENERGAN) 12.5 mg in dextrose 5 % 50 mL IVPB  12.5 mg Intravenous Q6H PRN Ismael Jama MD        simethicone chewable tablet 80 mg  1 tablet Oral QID PRN Ismael Jama MD         Current Outpatient Medications on File Prior to Visit   Medication Sig Dispense Refill    aspirin (ECOTRIN) 81 MG EC tablet Take 1 tablet (81 mg total) by mouth once daily. 150 tablet 2    prenatal vit,emilee 74-iron-folic 27 mg iron- 1 mg Tab Take 1 tablet by mouth once daily. 90 tablet 3     .    Pre-op Assessment    I have reviewed the Patient Summary Reports.     I have reviewed the Nursing Notes.    I have reviewed the Medications.     Review of Systems  Anesthesia Hx:  No problems with previous Anesthesia  History of prior surgery of interest to airway management or planning: Denies Family Hx of Anesthesia complications.   Denies Personal Hx of Anesthesia complications.   Social:  Non-Smoker    Hematology/Oncology:         -- Anemia:   EENT/Dental:EENT/Dental Normal   Cardiovascular:  Cardiovascular Normal Exercise tolerance: good     Pulmonary:  Pulmonary Normal    Renal/:  Renal/ Normal     Hepatic/GI:  Hepatic/GI Normal    Musculoskeletal:  Musculoskeletal Normal    OB/GYN/PEDS:  HTN with prev pregnancy    Neurological:  Neurology Normal    Endocrine:  Endocrine Normal    Dermatological:  Skin Normal    Psych:  Psychiatric Normal           Physical Exam  General:  Well nourished    Airway/Jaw/Neck:  Airway Findings: Mouth Opening: Normal Tongue: Normal  General Airway Assessment: Adult  Mallampati: III  Improves to III, II with phonation.  TM Distance: 4 - 6 cm  Jaw/Neck Findings:  Neck ROM: Normal ROM      Dental:  Dental Findings: In tact   Chest/Lungs:  Chest/Lungs Findings: Normal Respiratory Rate      Heart/Vascular:  Heart Findings: Rate: Normal  Rhythm: Regular Rhythm        Mental Status:  Mental Status Findings:  Alert and Oriented, Cooperative         Anesthesia Plan  Type of Anesthesia, risks & benefits discussed:  Anesthesia Type:  CSE  Patient's Preference:   Intra-op Monitoring Plan: standard ASA monitors  Intra-op Monitoring Plan Comments:   Post Op Pain Control Plan:   Post Op Pain Control Plan Comments:   Induction:    Beta Blocker:  Patient is not currently on a Beta-Blocker (No further documentation required).       Informed Consent: Patient understands risks and agrees with Anesthesia plan.  Questions answered. Anesthesia consent signed with patient.  ASA Score: 2     Day of Surgery Review of History & Physical: I have interviewed and examined the patient. I have reviewed the patient's H&P dated:  There are no significant changes.   H&P completed by Anesthesiologist.       Ready For Surgery From Anesthesia Perspective.

## 2020-10-07 NOTE — NURSING
30 year old G 4 P 2 at 39.1 wks presents to unit for scheduled IOL.   History/complications of  x2, SABx1, d&c, BV, HSV+, urolithiasis earlier in this pregnancy,  pre-E and high BP in previous pregnancy. Denies vaginal bleeding, denies leaking fluid. Fetus: fetal heart tones 135bpm, moderate variability,  +fetal movements. Contractions irregular. Abdomen soft/nontender. Vital signs stable Cervix: 4/60/-3. Reviewed plan of care with pt, educated on electronic fetal monitoring and assessments, and questions answered using [email or web address] . Will update Dr. Jama.

## 2020-10-07 NOTE — H&P
UPDATED HISTORY AND PHYSICAL        10/7/2020  Subjective:       Saadia Gayle is a 30 y.o.  female with IUP at 39w2d weeks gestation who presented for labor induction    Contractions have been occuring Q10 min and have increased in intensity.  This IUP is complicated by nothing   .  Patient reports contractions, denies vaginal bleeding, denies LOF.   Fetal Movement: normal.     PMHx:   Past Medical History:   Diagnosis Date    Hypertension in pregnancy        PSHx:   Past Surgical History:   Procedure Laterality Date    DILATION AND CURETTAGE OF UTERUS  2017    SAB       All:   Review of patient's allergies indicates:   Allergen Reactions    Penicillins Hives and Shortness Of Breath    Amoxicillin        Meds:   Medications Prior to Admission   Medication Sig Dispense Refill Last Dose    aspirin (ECOTRIN) 81 MG EC tablet Take 1 tablet (81 mg total) by mouth once daily. 150 tablet 2 Past Week at Unknown time    prenatal vit,emilee 74-iron-folic 27 mg iron- 1 mg Tab Take 1 tablet by mouth once daily. 90 tablet 3 10/5/2020 at Unknown time       SH:   Social History     Socioeconomic History    Marital status:      Spouse name: Not on file    Number of children: Not on file    Years of education: Not on file    Highest education level: Not on file   Occupational History    Not on file   Social Needs    Financial resource strain: Not on file    Food insecurity     Worry: Not on file     Inability: Not on file    Transportation needs     Medical: Not on file     Non-medical: Not on file   Tobacco Use    Smoking status: Never Smoker    Smokeless tobacco: Never Used   Substance and Sexual Activity    Alcohol use: No     Frequency: Never    Drug use: No    Sexual activity: Yes     Partners: Male   Lifestyle    Physical activity     Days per week: Not on file     Minutes per session: Not on file    Stress: Not on file   Relationships    Social connections     Talks on phone: Not  "on file     Gets together: Not on file     Attends Buddhist service: Not on file     Active member of club or organization: Not on file     Attends meetings of clubs or organizations: Not on file     Relationship status: Not on file   Other Topics Concern    Not on file   Social History Narrative    Not on file       FH:   Family History   Problem Relation Age of Onset    Hypertension Father 53    Breast cancer Neg Hx     Colon cancer Neg Hx     Ovarian cancer Neg Hx        OBHx:   OB History    Para Term  AB Living   4 2 2 0 1 2   SAB TAB Ectopic Multiple Live Births   1 0 0 0 2      # Outcome Date GA Lbr Vtio/2nd Weight Sex Delivery Anes PTL Lv   4 Current            3 Term 19 39w2d 04:41 / 00:22 3.685 kg (8 lb 2 oz) F Vag-Spont Spinal, EPI N ANNA MARIE      Complications: Shoulder Dystocia      Name: MAME YADAV      Apgar1: 8  Apgar5: 9   2 2017 12w0d          1 Term 14 40w0d   M Vag-Spont None N ANNA MARIE      Complications: Preeclampsia, Hypertension in pregnancy       Review of Systems: Non contributory      Objective:       BP (!) 107/59   Pulse 86   Temp 99.1 °F (37.3 °C) (Oral)   Resp 18   Ht 5' 1" (1.549 m)   Wt 67.5 kg (148 lb 13 oz)   LMP 2020   SpO2 98%   Breastfeeding Yes   BMI 28.12 kg/m²     Vitals:    10/07/20 0815 10/07/20 0830 10/07/20 0845 10/07/20 0900   BP: (!) 101/57 103/61 117/68 (!) 107/59   Pulse: 91 96 (!) 145 86   Resp:       Temp:       TempSrc:       SpO2: 98% 100% 99% 98%   Weight:       Height:           General:   alert, appears stated age and cooperative   Lungs:   clear to auscultation bilaterally   Heart:   regular rate and rhythm, S1, S2 normal, no murmur, click, rub or gallop   Abdomen:  soft, non-tender; bowel sounds normal; no masses,  no organomegaly   Extremities negative edema, negative erythema   FHT: 155 Cat 1 (reassuring)                 TOCO: Q 5 minutes   Presentations: cephalic by ultrasound   Cervix:     Dilation: " 7    Effacement: 50%    Station:  -3               AROM clear fluid     Lab Review  Blood Type A POS  GBBS: negative       Assessment:       39w2d weeks gestation.  LAbor     Patient Active Problem List   Diagnosis    Proteinuria affecting pregnancy in third trimester    Supervision of normal pregnancy    Abdominal pain affecting pregnancy          Plan:      Risks, benefits, alternatives and possible complications have been discussed in detail with the patient.   - Consents signed and to chart  - Admit to Labor and Delivery unit    - Epidural per Anesthesia  - Draw CBC, T&S  - Recheck in 2 hrs or PRN            Ismael Jama M.D.   OB/GYN    10/7/2020

## 2020-10-07 NOTE — ANESTHESIA PROCEDURE NOTES
CSE    Patient location during procedure: OB  Start time: 10/7/2020 4:46 AM  Timeout: 10/7/2020 4:45 AM  End time: 10/7/2020 5:00 AM    Staffing  Authorizing Provider: López Gallagher MD  Performing Provider: Tai Burkett MD    Preanesthetic Checklist  Completed: patient identified, site marked, surgical consent, pre-op evaluation, timeout performed, IV checked, risks and benefits discussed and monitors and equipment checked  CSE  Patient position: sitting  Prep: ChloraPrep  Patient monitoring: heart rate, cardiac monitor, continuous pulse ox and frequent blood pressure checks  Approach: midline  Spinal Needle  Needle type: pencil-tip   Needle gauge: 25 G  Needle length: 5 in  Epidural Needle  Injection technique: GINGER air  Needle type: Tuohy   Needle gauge: 17 G  Needle length: 3.5 in  Needle insertion depth: 7 cm  Location: L4-5  Needle localization: anatomical landmarks  Catheter  Catheter type: springwound  Catheter size: 19 G  Catheter at skin depth: 12.5 cm  Test dose: lidocaine 1.5% with Epi 1-to-200,000  Additional Documentation: incremental injection, negative aspiration for CSF, negative aspiration for heme and no paresthesia on injection  Assessment  Sensory level: T8   Dermatomal levels determined by pinch or prick

## 2020-10-07 NOTE — NURSING
1335 RN at bedside, Assessment completed.  Patient only able to wiggle toes, unable to bend knees or lift legs.    Epidural level at T-10.  Fundus firm and midline, at the umbilicus.  Will reassess in 30 min for potential toileting.

## 2020-10-07 NOTE — L&D DELIVERY NOTE
[facility]  Vaginal Delivery   DELIVERY NOTE:    After complete dilatation and +2 station  Patient pushes x 15 minutes , delivering    One viable infant sex   ( - )M / ( x) F    , in OA position   Umbilical cord was clamped and cut  , cord  blood sample was  collected   Uterine cavity manually revised for placenta.membranes and clots     Nuchal cord= NO (x)  YES (-)  Shoulder dystocia= NO   Apgar 9/9  Amniotic fluid= clear   Placenta= normal intact , complete   Umbilical Cord= 3 vessels     No cervical tears   Episiotomy =         Yes ( - )      NO  (  X)   Perineal tears =     Yes( x)    NO  ( - ) first degree , repaired CC 2-0  Vaginal tears=       Yes ( - )     NO  ( X)    EBL =  250cc  Sponge, lap, needle counts were correct   Vaginal sweep performed     No complications     Ismael Jama M.D.   OB/GYN          Indications:  (normal spontaneous vaginal delivery)  Pregnancy complicated by:   Patient Active Problem List   Diagnosis    Proteinuria affecting pregnancy in third trimester    Supervision of normal pregnancy     (normal spontaneous vaginal delivery)    Abdominal pain affecting pregnancy    39 weeks gestation of pregnancy    38 weeks gestation of pregnancy     Admitting GA: 39w2d    Delivery Information for Jo Ann Gayle    Birth information:  YOB: 2018   Time of birth: 10:44 AM   Sex: female   Head Delivery Date/Time: 10/7/2020 10:44 AM   Delivery type: Vaginal, Spontaneous   Gestational Age: 39w2d    Delivery Providers    Delivering clinician: Ismael Jama MD   Provider Role    Olimpia Mendieta, MEGHA Circulator    Lori Frazier RN Delivery Nurse            Measurements    Weight:   Length:          Apgars    Living status: Living  Apgars:  1 min.:  5 min.:  10 min.:  15 min.:  20 min.:    Skin color:  1  1       Heart rate:  2  2       Reflex irritability:  2  2       Muscle tone:  2  2       Respiratory effort:  2        Total:  9         Apgars assigned by: SPEEDY GARCIA RN         Operative Delivery    Forceps attempted?: No  Vacuum extractor attempted?: No         Shoulder Dystocia    Shoulder dystocia present?: No           Presentation    Presentation: Vertex  Position: Left Occiput Anterior           Interventions/Resuscitation    Method: Bulb Suctioning       Cord    Vessels: 3 vessels  Complications: None  Delayed Cord Clamping?: No  Cord Clamped Date/Time: 10/7/2020 10:44 AM  Cord Blood Disposition: Lab  Gases Sent?: No  Stem Cell Collection (by MD): No       Placenta    Placenta delivery date/time: 10/7/2020 1057  Placenta removal: Spontaneous  Placenta appearance: Intact  Placenta disposition: discarded           Labor Events:       labor: No     Labor Onset Date/Time:         Dilation Complete Date/Time:         Start Pushing Date/Time:         Start Pushing Date/Time:       Rupture Date/Time: 10/07/20  0830         Rupture type:          Fluid Amount:       Fluid Color: Clear      Fluid Odor:       Membrane Status: ARM (Artificial Rupture)               steroids: None     Antibiotics given for GBS: No     Induction: oxytocin     Indications for induction:  Elective     Augmentation: amniotomy     Indications for augmentation: Ineffective Contraction Pattern     Labor complications: None     Additional complications:          Cervical ripening:                     Delivery:      Episiotomy: None     Indication for Episiotomy:       Perineal Lacerations: 1st Repaired:      Periurethral Laceration:   Repaired:     Labial Laceration:   Repaired:     Sulcus Laceration:   Repaired:     Vaginal Laceration:   Repaired:     Cervical Laceration:   Repaired:     Repair suture:       Repair # of packets: 1     Last Value - EBL - Nursing (mL):       Sum - EBL - Nursing (mL): 0     Last Value - EBL - Anesthesia (mL):      Calculated QBL (mL): 75      Vaginal Sweep Performed: Yes     Surgicount Correct: Yes       Other providers:        Anesthesia    Method: Spinal, Epidural          Details (if applicable):  Trial of Labor      Categorization:      Priority:     Indications for :     Incision Type:       Additional  information:  Forceps:    Vacuum:    Breech:    Observed anomalies    Other (Comments):         Ismael Jama M.D.   OB/GYN    10/7/2020

## 2020-10-08 LAB
BASOPHILS # BLD AUTO: 0.01 K/UL (ref 0–0.2)
BASOPHILS NFR BLD: 0.2 % (ref 0–1.9)
DIFFERENTIAL METHOD: ABNORMAL
EOSINOPHIL # BLD AUTO: 0 K/UL (ref 0–0.5)
EOSINOPHIL NFR BLD: 0.3 % (ref 0–8)
ERYTHROCYTE [DISTWIDTH] IN BLOOD BY AUTOMATED COUNT: 15.5 % (ref 11.5–14.5)
HCT VFR BLD AUTO: 28.3 % (ref 37–48.5)
HGB BLD-MCNC: 8.7 G/DL (ref 12–16)
IMM GRANULOCYTES # BLD AUTO: 0.02 K/UL (ref 0–0.04)
IMM GRANULOCYTES NFR BLD AUTO: 0.3 % (ref 0–0.5)
LYMPHOCYTES # BLD AUTO: 1.7 K/UL (ref 1–4.8)
LYMPHOCYTES NFR BLD: 25 % (ref 18–48)
MCH RBC QN AUTO: 26.1 PG (ref 27–31)
MCHC RBC AUTO-ENTMCNC: 30.7 G/DL (ref 32–36)
MCV RBC AUTO: 85 FL (ref 82–98)
MONOCYTES # BLD AUTO: 0.5 K/UL (ref 0.3–1)
MONOCYTES NFR BLD: 8.1 % (ref 4–15)
NEUTROPHILS # BLD AUTO: 4.4 K/UL (ref 1.8–7.7)
NEUTROPHILS NFR BLD: 66.1 % (ref 38–73)
NRBC BLD-RTO: 0 /100 WBC
PLATELET # BLD AUTO: 198 K/UL (ref 150–350)
PMV BLD AUTO: 10.4 FL (ref 9.2–12.9)
RBC # BLD AUTO: 3.33 M/UL (ref 4–5.4)
WBC # BLD AUTO: 6.64 K/UL (ref 3.9–12.7)

## 2020-10-08 PROCEDURE — 85025 COMPLETE CBC W/AUTO DIFF WBC: CPT

## 2020-10-08 PROCEDURE — 99232 PR SUBSEQUENT HOSPITAL CARE,LEVL II: ICD-10-PCS | Mod: ,,, | Performed by: OBSTETRICS & GYNECOLOGY

## 2020-10-08 PROCEDURE — 25000003 PHARM REV CODE 250: Performed by: OBSTETRICS & GYNECOLOGY

## 2020-10-08 PROCEDURE — 99232 SBSQ HOSP IP/OBS MODERATE 35: CPT | Mod: ,,, | Performed by: OBSTETRICS & GYNECOLOGY

## 2020-10-08 PROCEDURE — 11000001 HC ACUTE MED/SURG PRIVATE ROOM

## 2020-10-08 PROCEDURE — 36415 COLL VENOUS BLD VENIPUNCTURE: CPT

## 2020-10-08 RX ORDER — NAPROXEN 500 MG/1
500 TABLET ORAL EVERY 8 HOURS PRN
Qty: 20 TABLET | Refills: 0 | Status: SHIPPED | OUTPATIENT
Start: 2020-10-08

## 2020-10-08 RX ADMIN — NAPROXEN 500 MG: 500 TABLET ORAL at 01:10

## 2020-10-08 RX ADMIN — NAPROXEN 500 MG: 500 TABLET ORAL at 07:10

## 2020-10-08 RX ADMIN — PRENATAL VIT W/ FE FUMARATE-FA TAB 27-0.8 MG 1 TABLET: 27-0.8 TAB at 09:10

## 2020-10-08 NOTE — ANESTHESIA POSTPROCEDURE EVALUATION
Anesthesia Post Evaluation    Patient: Saadia Gayle    Procedure(s) Performed: * No procedures listed *    Final Anesthesia Type: spinal    Patient location during evaluation: labor & delivery  Patient participation: Yes- Able to Participate  Level of consciousness: awake and alert  Post-procedure vital signs: reviewed and stable  Pain management: adequate  Airway patency: patent    PONV status at discharge: No PONV  Anesthetic complications: no      Cardiovascular status: blood pressure returned to baseline and hemodynamically stable  Respiratory status: unassisted  Hydration status: euvolemic  Follow-up not needed.          Vitals Value Taken Time   BP 90/66 10/08/20 0800   Temp 36.7 °C (98 °F) 10/08/20 0800   Pulse 69 10/08/20 0800   Resp 18 10/08/20 0800   SpO2 100 % 10/08/20 0800         No case tracking events are documented in the log.      Pain/Pb Score: Pain Rating Prior to Med Admin: 5 (10/8/2020  1:05 AM)  Pain Rating Post Med Admin: 0 (10/8/2020  2:00 AM)    Pt denies headaches or back pain. Pt reports that LE function has returned to baseline. Pt denies issues with ambulation or urination.

## 2020-10-08 NOTE — PROGRESS NOTES
"Saadia Gayle is a 30 y.o. female   PPD #1 status post  Spontaneous vaginal delivery. has no problems, feels well, no complaints  Patient reports no abd pain that is well Relieved by Oral pain medications. Lochia is mild to moderate  and decreasing, Voiding without difficulty Ambulating with no difficulty, has passed flatus, has not had BM,  Patient does plan to breast feed.    Objective:       /78 (BP Location: Left arm, Patient Position: Lying)   Pulse 80   Temp 98.1 °F (36.7 °C) (Oral)   Resp 18   Ht 5' 1" (1.549 m)   Wt 67.5 kg (148 lb 13 oz)   LMP 01/06/2020   SpO2 98%   Breastfeeding Yes   BMI 28.12 kg/m²   Vitals:    10/07/20 1910 10/08/20 0200 10/08/20 0800 10/08/20 1200   BP: (!) 104/54 98/60 90/66 113/78   BP Location: Left arm Left arm Left arm Left arm   Patient Position: Lying Lying Lying Lying   Pulse: 82 86 69 80   Resp: 18 18 18 18   Temp: 98 °F (36.7 °C) 97.7 °F (36.5 °C) 98 °F (36.7 °C) 98.1 °F (36.7 °C)   TempSrc: Oral Oral Oral Oral   SpO2: 97% 100% 100% 98%   Weight:       Height:         General:   alert, appears stated age and cooperative   Lungs:   clear to auscultation bilaterally   Heart:   regular rate and rhythm, S1, S2 normal, no murmur, click, rub or gallop   Abdomen:  soft, non-tender; bowel sounds normal; no masses,  no organomegaly   Uterus:  firm located at the umblicus.        Extremities: peripheral pulses normal, no pedal edema, no clubbing or cyanosis     Lab Review  Recent Results (from the past 72 hour(s))   COVID-19 Rapid Screening    Collection Time: 10/06/20  8:26 PM   Result Value Ref Range    SARS-CoV-2 RNA, Amplification, Qual Negative Negative   Comprehensive metabolic panel    Collection Time: 10/06/20 10:26 PM   Result Value Ref Range    Sodium 138 136 - 145 mmol/L    Potassium 3.6 3.5 - 5.1 mmol/L    Chloride 109 95 - 110 mmol/L    CO2 20 (L) 23 - 29 mmol/L    Glucose 100 70 - 110 mg/dL    BUN, Bld 6 6 - 20 mg/dL    Creatinine 0.5 0.5 - 1.4 " mg/dL    Calcium 8.1 (L) 8.7 - 10.5 mg/dL    Total Protein 6.0 6.0 - 8.4 g/dL    Albumin 2.4 (L) 3.5 - 5.2 g/dL    Total Bilirubin 0.2 0.1 - 1.0 mg/dL    Alkaline Phosphatase 297 (H) 55 - 135 U/L    AST 13 10 - 40 U/L    ALT 13 10 - 44 U/L    Anion Gap 9 8 - 16 mmol/L    eGFR if African American >60 >60 mL/min/1.73 m^2    eGFR if non African American >60 >60 mL/min/1.73 m^2   Hepatitis B surface antigen    Collection Time: 10/06/20 10:26 PM   Result Value Ref Range    Hepatitis B Surface Ag Negative Negative   HIV 1/2 Ag/Ab (4th Gen)    Collection Time: 10/06/20 10:26 PM   Result Value Ref Range    HIV 1/2 Ag/Ab Negative Negative   RAPID HIV (SUDS) 1 and 2 Antibodies    Collection Time: 10/06/20 10:26 PM   Result Value Ref Range    HIV Rapid Testing Non-Reactive Negative   RPR    Collection Time: 10/06/20 10:26 PM   Result Value Ref Range    RPR Non-reactive Non-reactive   Type & Screen, Labor & Delivery    Collection Time: 10/06/20 10:26 PM   Result Value Ref Range    Group & Rh A POS     Indirect Pradeep NEG    CBC with Auto Differential    Collection Time: 10/06/20 10:32 PM   Result Value Ref Range    WBC 5.26 3.90 - 12.70 K/uL    RBC 3.36 (L) 4.00 - 5.40 M/uL    Hemoglobin 8.8 (L) 12.0 - 16.0 g/dL    Hematocrit 28.5 (L) 37.0 - 48.5 %    Mean Corpuscular Volume 85 82 - 98 fL    Mean Corpuscular Hemoglobin 26.2 (L) 27.0 - 31.0 pg    Mean Corpuscular Hemoglobin Conc 30.9 (L) 32.0 - 36.0 g/dL    RDW 15.2 (H) 11.5 - 14.5 %    Platelets 218 150 - 350 K/uL    MPV 10.6 9.2 - 12.9 fL    Immature Granulocytes 0.2 0.0 - 0.5 %    Gran # (ANC) 3.3 1.8 - 7.7 K/uL    Immature Grans (Abs) 0.01 0.00 - 0.04 K/uL    Lymph # 1.4 1.0 - 4.8 K/uL    Mono # 0.5 0.3 - 1.0 K/uL    Eos # 0.0 0.0 - 0.5 K/uL    Baso # 0.01 0.00 - 0.20 K/uL    nRBC 0 0 /100 WBC    Gran% 62.7 38.0 - 73.0 %    Lymph% 27.2 18.0 - 48.0 %    Mono% 9.5 4.0 - 15.0 %    Eosinophil% 0.2 0.0 - 8.0 %    Basophil% 0.2 0.0 - 1.9 %    Differential Method Automated    CBC  auto differential    Collection Time: 10/08/20  5:03 AM   Result Value Ref Range    WBC 6.64 3.90 - 12.70 K/uL    RBC 3.33 (L) 4.00 - 5.40 M/uL    Hemoglobin 8.7 (L) 12.0 - 16.0 g/dL    Hematocrit 28.3 (L) 37.0 - 48.5 %    Mean Corpuscular Volume 85 82 - 98 fL    Mean Corpuscular Hemoglobin 26.1 (L) 27.0 - 31.0 pg    Mean Corpuscular Hemoglobin Conc 30.7 (L) 32.0 - 36.0 g/dL    RDW 15.5 (H) 11.5 - 14.5 %    Platelets 198 150 - 350 K/uL    MPV 10.4 9.2 - 12.9 fL    Immature Granulocytes 0.3 0.0 - 0.5 %    Gran # (ANC) 4.4 1.8 - 7.7 K/uL    Immature Grans (Abs) 0.02 0.00 - 0.04 K/uL    Lymph # 1.7 1.0 - 4.8 K/uL    Mono # 0.5 0.3 - 1.0 K/uL    Eos # 0.0 0.0 - 0.5 K/uL    Baso # 0.01 0.00 - 0.20 K/uL    nRBC 0 0 /100 WBC    Gran% 66.1 38.0 - 73.0 %    Lymph% 25.0 18.0 - 48.0 %    Mono% 8.1 4.0 - 15.0 %    Eosinophil% 0.3 0.0 - 8.0 %    Basophil% 0.2 0.0 - 1.9 %    Differential Method Automated        I/O    Intake/Output Summary (Last 24 hours) at 10/8/2020 1347  Last data filed at 10/7/2020 1700  Gross per 24 hour   Intake --   Output 1400 ml   Net -1400 ml        Assessment:     Patient Active Problem List   Diagnosis    Proteinuria affecting pregnancy in third trimester    Supervision of normal pregnancy     (normal spontaneous vaginal delivery)    Abdominal pain affecting pregnancy    39 weeks gestation of pregnancy    38 weeks gestation of pregnancy        Plan:   1. Patient doing well. Continue routine management and advances.  2. Continue PO pain meds. Pain well controlled.  3. H/h 8.7/28.3.   4. Encourage ambulation.     Ismael Jama M.D.   OB/GYN    10/8/2020

## 2020-10-08 NOTE — PLAN OF CARE
Mother is both breast and formula feeding infant per her choice. States she tried to breastfeed for approx. 5 months with her first baby but did both mostly formula feeding. Discussed benefits of exclusive breastfeeding, risks of formula feeding (including nipple confusion, flow dependence, and decreased milk supply). Discussed infant's stomach size, adequacy of colostrum, supply and demand. Encouraged exclusive breastfeeding, however, if mother chooses to do both, encouraged to start with breast first at each feeding and limit formula supplementation to minimum necessary. Breastfeeding  guide at bed side and reviewed. Mother reports difficulty latching on L breast. Provided positioning/latch help for both breasts. Mother verbalized understanding of all teaching.    Mother will breastfeed on cue at least 8 or more times in 24 hours. Mother will monitor for adequate supply and monitor wet and dirty diapers. Mother will call for any breastfeeding needs.

## 2020-10-08 NOTE — LACTATION NOTE
[facility]  Lactation Note - Mom    SUMMARY     Maternal Assessment    Breast Size Issue: none  Breast Shape: Bilateral:, round  Breast Density: Bilateral:, soft  Areola: Bilateral:, elastic  Nipples: Bilateral:, everted(L sl flatter, still everted, lengthens w/ stim)  Left Nipple Symptoms: tender  Right Nipple Symptoms: tender      LATCH Score     see  flowsheets    Breasts WDL    Breast WDL: WDL  Left Nipple Symptoms: tender  Right Nipple Symptoms: tender    Maternal Infant Feeding    Maternal Preparation: breast care, hand hygiene  Maternal Emotional State: assist needed, relaxed  Infant Positioning: cradle, cross-cradle  Signs of Milk Transfer: audible swallow  Pain with Feeding: yes  Pain Location: nipple, right  Pain Description: soreness  Comfort Measures Before/During Feeding: latch adjusted, infant position adjusted, suction broken using finger, maternal position adjusted  Comfort Measures Following Feeding: air-drying encouraged, expressed milk applied, other (see comments)(lanolin given)  Nipple Shape After Feeding, Right: pinched(assisted w/ positioning and deeper latch)  Latch Assistance: yes    Lactation Referrals         Lactation Interventions    Breast Care: Breastfeeding: lanolin to nipples, breast milk to nipples, open to air  Breastfeeding Assistance: assisted with positioning, assisted with techniques for flat/inverted nipples, feeding on demand promoted, feeding cue recognition promoted, infant latch-on verified, support offered  Breast Care: Breastfeeding: lanolin to nipples, breast milk to nipples, open to air  Breastfeeding Assistance: assisted with positioning, assisted with techniques for flat/inverted nipples, feeding on demand promoted, feeding cue recognition promoted, infant latch-on verified, support offered  Breastfeeding Support: diary/feeding log utilized, encouragement provided, infant-mother separation minimized       Breastfeeding Session    Infant  Positioning: cradle, cross-cradle  Signs of Milk Transfer: audible swallow    Maternal Information

## 2020-10-08 NOTE — PLAN OF CARE
Patient is Upper sorbian speaking.   utilized  vaibhav Willams #[id number]    Patient did no answer room phone or cell phone. Medical records reviewed.    Chief Complaint/Reason for Admission:  Infant is a 0 days Girl Saadia Gayle born at 39w2d  Infant was born on 10/7/2018 at 10:44 AM via Vaginal, Spontaneous.     Maternal History:  The mother is a 30 y.o.   . She  has a past medical history of Hypertension in pregnancy ().    Delivery Information for Jo Ann Gayle     Birth information:  YOB: 2018   Time of birth: 10:44 AM   Sex: female   Head Delivery Date/Time: 10/7/2020 10:44 AM   Delivery type: Vaginal, Spontaneous   Gestational Age: 39w2d          10/08/20 1446   OB SCREEN   Source of Information health record   Received Prenatal Care Yes   Any indications/suspicions for None   Is this a teen pregnancy No   Is the baby in NICU No   Indication for adoption/Safe Haven No   Indication for DME/post-acute needs No   HIV (+) No   Any congenital  disorders No   Fetal demise/ death No

## 2020-10-09 VITALS
WEIGHT: 148.81 LBS | RESPIRATION RATE: 18 BRPM | BODY MASS INDEX: 28.1 KG/M2 | DIASTOLIC BLOOD PRESSURE: 69 MMHG | TEMPERATURE: 98 F | OXYGEN SATURATION: 100 % | HEART RATE: 70 BPM | SYSTOLIC BLOOD PRESSURE: 95 MMHG | HEIGHT: 61 IN

## 2020-10-09 PROBLEM — O12.13 PROTEINURIA AFFECTING PREGNANCY IN THIRD TRIMESTER: Status: RESOLVED | Noted: 2019-03-08 | Resolved: 2020-10-09

## 2020-10-09 PROBLEM — Z3A.38 38 WEEKS GESTATION OF PREGNANCY: Status: RESOLVED | Noted: 2020-10-07 | Resolved: 2020-10-09

## 2020-10-09 PROCEDURE — 99238 PR HOSPITAL DISCHARGE DAY,<30 MIN: ICD-10-PCS | Mod: ,,, | Performed by: OBSTETRICS & GYNECOLOGY

## 2020-10-09 PROCEDURE — 25000003 PHARM REV CODE 250: Performed by: OBSTETRICS & GYNECOLOGY

## 2020-10-09 PROCEDURE — 99238 HOSP IP/OBS DSCHRG MGMT 30/<: CPT | Mod: ,,, | Performed by: OBSTETRICS & GYNECOLOGY

## 2020-10-09 RX ADMIN — PRENATAL VIT W/ FE FUMARATE-FA TAB 27-0.8 MG 1 TABLET: 27-0.8 TAB at 09:10

## 2020-10-09 NOTE — NURSING
[facility]  at pt bedside. Reviewed discharge documentation and medications with patient. Pt verbalized f/u appt is to be scheduled w/ OB. Pt is bonding with baby. Smiles appropriately at baby. Family support noted at bedside.  Mother shows she is able to care for herself and baby. Patient reports having help at home. Pt transported via wheelchair with baby in arms for discharge.

## 2020-10-09 NOTE — PROGRESS NOTES
"Saadia Gayle is a 30 y.o. female   PPD #2 status post  Spontaneous vaginal delivery. has no problems, feels well, no complaints  Patient reports no abd pain that is well Relieved by Oral pain medications. Lochia is mild to moderate  and decreasing, Voiding without difficulty Ambulating with no difficulty, has passed flatus, has not had BM,  Patient does plan to breast feed.    Objective:       BP 95/69 (BP Location: Left arm, Patient Position: Sitting)   Pulse 70   Temp 97.9 °F (36.6 °C) (Oral)   Resp 18   Ht 5' 1" (1.549 m)   Wt 67.5 kg (148 lb 13 oz)   LMP 01/06/2020   SpO2 100%   Breastfeeding Yes   BMI 28.12 kg/m²   Vitals:    10/08/20 1600 10/08/20 1939 10/09/20 0200 10/09/20 0800   BP: 120/84 (!) 122/58 108/62 95/69   BP Location: Left arm Right arm Right arm Left arm   Patient Position: Sitting Lying Lying Sitting   Pulse: 104 71 75 70   Resp: 18 18 16 18   Temp: 98.5 °F (36.9 °C) 97.7 °F (36.5 °C) 97.8 °F (36.6 °C) 97.9 °F (36.6 °C)   TempSrc: Oral Oral Oral Oral   SpO2: 98% 98% 98% 100%   Weight:       Height:         General:   alert, appears stated age and cooperative   Lungs:   clear to auscultation bilaterally   Heart:   regular rate and rhythm, S1, S2 normal, no murmur, click, rub or gallop   Abdomen:  soft, non-tender; bowel sounds normal; no masses,  no organomegaly   Uterus:  firm located at the umblicus.        Extremities: peripheral pulses normal, no pedal edema, no clubbing or cyanosis     Lab Review  Recent Results (from the past 72 hour(s))   COVID-19 Rapid Screening    Collection Time: 10/06/20  8:26 PM   Result Value Ref Range    SARS-CoV-2 RNA, Amplification, Qual Negative Negative   Comprehensive metabolic panel    Collection Time: 10/06/20 10:26 PM   Result Value Ref Range    Sodium 138 136 - 145 mmol/L    Potassium 3.6 3.5 - 5.1 mmol/L    Chloride 109 95 - 110 mmol/L    CO2 20 (L) 23 - 29 mmol/L    Glucose 100 70 - 110 mg/dL    BUN, Bld 6 6 - 20 mg/dL    Creatinine 0.5 " 0.5 - 1.4 mg/dL    Calcium 8.1 (L) 8.7 - 10.5 mg/dL    Total Protein 6.0 6.0 - 8.4 g/dL    Albumin 2.4 (L) 3.5 - 5.2 g/dL    Total Bilirubin 0.2 0.1 - 1.0 mg/dL    Alkaline Phosphatase 297 (H) 55 - 135 U/L    AST 13 10 - 40 U/L    ALT 13 10 - 44 U/L    Anion Gap 9 8 - 16 mmol/L    eGFR if African American >60 >60 mL/min/1.73 m^2    eGFR if non African American >60 >60 mL/min/1.73 m^2   Hepatitis B surface antigen    Collection Time: 10/06/20 10:26 PM   Result Value Ref Range    Hepatitis B Surface Ag Negative Negative   HIV 1/2 Ag/Ab (4th Gen)    Collection Time: 10/06/20 10:26 PM   Result Value Ref Range    HIV 1/2 Ag/Ab Negative Negative   RAPID HIV (SUDS) 1 and 2 Antibodies    Collection Time: 10/06/20 10:26 PM   Result Value Ref Range    HIV Rapid Testing Non-Reactive Negative   RPR    Collection Time: 10/06/20 10:26 PM   Result Value Ref Range    RPR Non-reactive Non-reactive   Type & Screen, Labor & Delivery    Collection Time: 10/06/20 10:26 PM   Result Value Ref Range    Group & Rh A POS     Indirect Pradeep NEG    CBC with Auto Differential    Collection Time: 10/06/20 10:32 PM   Result Value Ref Range    WBC 5.26 3.90 - 12.70 K/uL    RBC 3.36 (L) 4.00 - 5.40 M/uL    Hemoglobin 8.8 (L) 12.0 - 16.0 g/dL    Hematocrit 28.5 (L) 37.0 - 48.5 %    Mean Corpuscular Volume 85 82 - 98 fL    Mean Corpuscular Hemoglobin 26.2 (L) 27.0 - 31.0 pg    Mean Corpuscular Hemoglobin Conc 30.9 (L) 32.0 - 36.0 g/dL    RDW 15.2 (H) 11.5 - 14.5 %    Platelets 218 150 - 350 K/uL    MPV 10.6 9.2 - 12.9 fL    Immature Granulocytes 0.2 0.0 - 0.5 %    Gran # (ANC) 3.3 1.8 - 7.7 K/uL    Immature Grans (Abs) 0.01 0.00 - 0.04 K/uL    Lymph # 1.4 1.0 - 4.8 K/uL    Mono # 0.5 0.3 - 1.0 K/uL    Eos # 0.0 0.0 - 0.5 K/uL    Baso # 0.01 0.00 - 0.20 K/uL    nRBC 0 0 /100 WBC    Gran% 62.7 38.0 - 73.0 %    Lymph% 27.2 18.0 - 48.0 %    Mono% 9.5 4.0 - 15.0 %    Eosinophil% 0.2 0.0 - 8.0 %    Basophil% 0.2 0.0 - 1.9 %    Differential Method  Automated    CBC auto differential    Collection Time: 10/08/20  5:03 AM   Result Value Ref Range    WBC 6.64 3.90 - 12.70 K/uL    RBC 3.33 (L) 4.00 - 5.40 M/uL    Hemoglobin 8.7 (L) 12.0 - 16.0 g/dL    Hematocrit 28.3 (L) 37.0 - 48.5 %    Mean Corpuscular Volume 85 82 - 98 fL    Mean Corpuscular Hemoglobin 26.1 (L) 27.0 - 31.0 pg    Mean Corpuscular Hemoglobin Conc 30.7 (L) 32.0 - 36.0 g/dL    RDW 15.5 (H) 11.5 - 14.5 %    Platelets 198 150 - 350 K/uL    MPV 10.4 9.2 - 12.9 fL    Immature Granulocytes 0.3 0.0 - 0.5 %    Gran # (ANC) 4.4 1.8 - 7.7 K/uL    Immature Grans (Abs) 0.02 0.00 - 0.04 K/uL    Lymph # 1.7 1.0 - 4.8 K/uL    Mono # 0.5 0.3 - 1.0 K/uL    Eos # 0.0 0.0 - 0.5 K/uL    Baso # 0.01 0.00 - 0.20 K/uL    nRBC 0 0 /100 WBC    Gran% 66.1 38.0 - 73.0 %    Lymph% 25.0 18.0 - 48.0 %    Mono% 8.1 4.0 - 15.0 %    Eosinophil% 0.3 0.0 - 8.0 %    Basophil% 0.2 0.0 - 1.9 %    Differential Method Automated        I/O  No intake or output data in the 24 hours ending 10/09/20 0942     Assessment:     Patient Active Problem List   Diagnosis        Supervision of normal pregnancy     (normal spontaneous vaginal delivery)        39 weeks gestation of pregnancy            Plan:   1. Patient doing well. Continue routine management and advances.  2. Continue PO pain meds. Pain well controlled.  3. H/h 8.7/28.3.   4. Encourage ambulation.   Patient is Discharged  home today   General recommendations and alarm signs are given  Pelvic rest   Follow up with Dr Jama  in  ( )2  -  (x ) 4   weeks   Rx sent electronically  To pharmacy on file   All questions answered   Scheduled for outpatient Lap BTKARLEE Jama M.D.   OB/GYN          Ismael Jama M.D.   OB/GYN    10/9/2020

## 2020-10-09 NOTE — PLAN OF CARE
Rounded on pt. Baby asleep in mom's bed next to her-discouraged. Educated on safe sleep. D/c teaching done. Discussed benefits of BR/risks of FF; supply/demand. Mom will continue to breastfeed frequently & on cue at least 8+ times/24 hrs.  Will monitor for signs of deep latch & adequate fdg; I&O.  Will have baby's weight checked at ped's office in the next couple of days after d/c from hospital as recommended. Discussed available resources in Breastfeeding Guide. Instructed to call for any questions/needs. Verbalized understanding.

## 2020-10-09 NOTE — DISCHARGE INSTRUCTIONS
"Instrucciones Para Guido de Salima    Instrucciones a Seguir    Dieta regular  Actividad: Aumentarntar gradualmente  Stephanie: Regadera o Valerie  Restricciones: No levantar nada pesado  Cuidado Personal: No tampones o duchas vaginales  Actividad Sexual: Sommer relaciones sexuales  Planificacion Familiar: Consulta con lester medico  Cuidado de los Senos: Use un sosten de soporte  Regresar al trabajo/escuela: Cuando lester medico le indique    Cuando debe llamar al Doctor    *Fiebre de 100.4 o mas alto  *Nausea/Vomito persistente  *Secrecion de la incision  *Johnna sangrado vaginal o coagulos  *Inflamacion/dolor en los brazos o las piernas  *Severo dolor de grupo, vision borrosa or desmayos  *Frequencia/ardor urinario  *Senales de depresion post-parto        La Depresion Post-Parto    Usted acaba de tener un modesto'.  A pesar de que sabe que deberia sentirse emocionada y wilson, lo que seinte son ganas de llorar sin motivo y tiene dificultades para realizar elena tareas diarias.  Usted pasa la mayor parte del tiempo cole, cansada y desesperanzada, y hasta podria sentirse avergonzada o culpable.  Hebert lo que le esta sucediendo no es culpa suya, y puede sentirse mejor.  Hable con lester medico para que la ayude.     La Depresion Despues del Parto    Carroll vez sienta cansancio y ganas de llorar fadia despues de guido a edmund.  Esta etapa melancolica, denominada "baby blues", puede hacerla sentir cole y desesperanzada, o temerosa de que algo genaro le vaya a pasar al modesto.  Algunas mujeres hasta llegan a poner en dominic el que puedan ser buenas madres.    Que' es la Depresion?    La depresion es un trastorno del animo que afecta lester manera de pernsar y de sentir.  El sintoma mas frecuente es un sentimiento de honda tristeza: tambien podria causane la sensacion de que ya no puede sobrellevar la samantha.    Otros sintomas incluyen:      * Ganar o perder mucho peso  * Dormir en exceso o demasiado poco  * Estar cansada todo el tiempo  * Sentirse inquieta  * " Tener miedo de querer herir al modesto'  * No tener interes por el modesto'  * Sentirse inutil o culpable   * No encontrar placer en las cosas que solia gustarle hacer  * Dificultades para pensar con claridad o hiram decisiones  * Pensar sobre la muerte o el suicidio     Que' Causa la Depresion Postparto?    La causea exacta de la depresion postarto se desconce, aunque posiblemente es el resultado de los cambios hormonales que suceden bree y despues del parto.  Tambien puede deberse al cansancio que le causan las exigencias del modesto' y el proceso de adaptacion a lester maternidad.  Todos estos factores podrian deprimirla.  En algunos casos, existe kwaku predisposicion genetica a herlinda tipo de depresion.    La depresion puede tratarse    Lo wheeler es que hay muchas maneras de tratar la depresion postparo.  Emprenda el primer paso para sentirse mejor hablando con lester medico.     Recursos    * National Institutes of Mental Health-- 737-472-2857     www.nimh.nih.gov    * National [place] on Mental Illness -- 486-671-3413     Www.elisabet.org    * Mental Health Krysta --  [phone]     Www.[facility].org    * National Suidide Hotline -- 421.562.9360    8970-7453 [employer]  Todos los derechos reservados.  Esta informacion no pretende sustituir las atencion medica profesional.  Solo lester medico puede diagnosticar y tratar un problema de rachele.           Instrucciones de la lactancia maternal para los bebes recién nacidos:    La [employer] recomienda la leche maternal jacquelyn la unica Kaylee de nutrición para lester bebé bree los primeros 6 meses de la samantha, y puede continuar, con la introducción de comida, hasta y despues de 1 ano de edad. Informado sobre doty consejos: Hay muchos beneficios de amamantar para el rachele de la madre y del bebé. Sommer los chupones, teteras, o botellas por lo menos por las primeras 4 semanas. Usar los chupones, teteras, o botellas pueden interumpir el proceso de amamantar.     Alimenta en cuanto hay muestras de hambre:  o Nichole en la boca, hacienda movimientos de succionar.  o Ruiditos suavecitos o estirarse  o Llorar es un signo de hambre tardío: no esperes a que el bebé llore.   Es de esperarse que haya 8-12 alimentaciones por 24 horas, aunque estas alimentaciones no sigan un horario definido.   Alterna el seno con el que empiezas, o empieza con el seno que se siente más lleno.   Cambia de lado cuando el bebé empiece a tragar más despacio o se desconecte del seno.   Esta ora si el bebé no come del ashely seno en cada alimentación.   Si el bebé esta muy dormido o somnoliente: el contacto de piel a piel puede animarlo a empezar a comer:  o Quítale la camiseta y acuéstalo sobre tu pecho desnudo   Duerme cerca de tu bebé, aun en la casa. Aprende a guido pecho acostada.   En la posición correcta los dos estarán cómodos:  o barbilla con seno, pecho con pecho  o Labio del bebé abierto hacia afuera para tragar: el labio del bebé debe estar volteado hacia afuera  o Boca abierta ora juju: la boca del bebé cubre la mayoría de la areola (el área oscura del seno)--no nada más el pezón   Fíjate en los signos de que hay transferencia de leche:  o Puedes oír al bebé tragar.  o No se oyen ruidos más o menos ousmane jacquelyn clic.  o El bebé no demuestra que tiene más hambre después de la alimentación.  o El cuerpo del bebé y elena nichole están relajados por un tiempo corto.   Lo que entra, tiene que salir. Está pendiente de:  o Al cuarto día, por lo menos 3 cacas al día.  o La sheba cambia de keon a giselle o café y luego a amarillo más líquido cuando baja tu leche.  o Después del cuarto día, por lo menos 6 paòales mojados/pesados al día.  o La orina debe ser de color amarillo chelsea cuando baja tu leche.   Debes hiram agua cuando tienes sed y comer alimentos sanos cuando tiene hambre. Dominique siesta para descansar lo suficiente.    No tomes medicamentos o alcohol sino con el consejo de lester  doctor. Puedes llamar al [facility] ([phone]), [place], 8am-5pm, para obtener información sobre los medicamentos y la leche maternal.   La jordan de seguimiento con la pediatra debe ser 2 días después de salir del hospital. El bebé deberia allison ganado el peso de nacimiento cuando tiene 10-14 días de samantha.

## 2020-10-09 NOTE — LACTATION NOTE
[facility]  Lactation Note - Mom    SUMMARY     Maternal Assessment    Breast Size Issue: none  Breast Shape: Bilateral:, round  Breast Density: Bilateral:, filling  Areola: Bilateral:, elastic  Nipples: Bilateral:, everted(L sl flatter, still everted, lengthens w/ stim)  Left Nipple Symptoms: other (see comments)(pink per mom)  Right Nipple Symptoms: other (see comments)(pink per mom)      LATCH Score         Breasts WDL    Breast WDL: WDL except, nipple symptoms  Left Nipple Symptoms: other (see comments)(pink per mom)  Right Nipple Symptoms: other (see comments)(pink per mom)    Maternal Infant Feeding    Maternal Preparation: breast care, hand hygiene  Maternal Emotional State: relaxed  Infant Positioning: cradle, cross-cradle  Signs of Milk Transfer: audible swallow  Pain with Feeding: no(per mom)  Pain Location: nipple, right  Pain Description: soreness  Comfort Measures Before/During Feeding: latch adjusted, infant position adjusted, suction broken using finger, maternal position adjusted  Comfort Measures Following Feeding: air-drying encouraged, expressed milk applied, other (see comments)(lanolin given)  Nipple Shape After Feeding, Right: pinched(assisted w/ positioning and deeper latch)  Latch Assistance: yes    Lactation Referrals    Lactation Referrals: outpatient lactation program, pediatric care provider  Outpatient Lactation Program Lactation Follow-up Date/Time: enc to call warmline w/questions prn  Pediatric Care Provider Lactation Follow-up Date/Time: within 2-3 days;Dr Caballero    Lactation Interventions    Breast Care: Breastfeeding: breast milk to nipples, lanolin to nipples  Breastfeeding Assistance: feeding cue recognition promoted, feeding on demand promoted, support offered  Breast Care: Breastfeeding: breast milk to nipples, lanolin to nipples  Breastfeeding Assistance: feeding cue recognition promoted, feeding on demand promoted, support offered  Breastfeeding Support:  diary/feeding log utilized, encouragement provided, lactation counseling provided, maternal hydration promoted, maternal nutrition promoted, maternal rest encouraged       Breastfeeding Session    Infant Positioning: cradle, cross-cradle  Signs of Milk Transfer: audible swallow    Maternal Information

## 2020-10-09 NOTE — PLAN OF CARE
Patient ambulating freely in room. Tolerating regular diet. Voiding spontaneously. Questions answered/encouraged; reassurance provided.  Pt states pain goal met with prescribed medications per MD.  Bonding with baby AEB holding, breastfeeding and formula feeding, dressing, and changing baby's diaper. Smiles appropriately at baby. POC reviewed with pt; able to verbalize acceptance and understanding. VS stable. Call light in reach, side rails up x2, nonskid socks on, bed in lowest position with wheels locked.  Remains free from falls and/or injury. NAD noted.  Will continue to monitor.

## 2020-10-09 NOTE — DISCHARGE SUMMARY
Delivery Discharge Summary  Obstetrics      Primary OB Clinician: Ismael Jama    Admission date: 10/6/2020  Discharge date: 10/09/2020    Admit Dx:   Patient Active Problem List   Diagnosis    Proteinuria affecting pregnancy in third trimester    Supervision of normal pregnancy     (normal spontaneous vaginal delivery)    Abdominal pain affecting pregnancy    39 weeks gestation of pregnancy    38 weeks gestation of pregnancy     Discharge Dx:   Patient Active Problem List   Diagnosis    Proteinuria affecting pregnancy in third trimester    Supervision of normal pregnancy     (normal spontaneous vaginal delivery)    Abdominal pain affecting pregnancy    39 weeks gestation of pregnancy    38 weeks gestation of pregnancy       Procedure:     No results for input(s): WBC, RBC, HGB, HCT, PLT, MCV, MCH, MCHC in the last 24 hours.    Hospital Course:  Pt is a 30 y.o. now , PPD # 2 was admitted on 10/6/2020 . On initial assessment, vital signs were stable and physical exam was Normal.   Infant was in (x)cephalic  / (-) breech   presentation. Patient was subsequently admitted to labor and delivery unit with signed consents.  Patient delivered a single viable  female. Please see delivery note for further details. Pt was in stable condition post delivery and was transferred to the Mother-Baby Unit. Her postpartum course was uncomplicated. On discharge day, patient's pain is well  controlled with oral pain medications. Pt is tolerating ambulation without SOB or CP, and PO diet without N/V. Reports lochia is minimal in degree. Denies any HA, vision changes, F/C, LE swelling. Denies any breast pain/soreness.  Pt in stable condition and ready for discharge, instructed to continue pain medications  and to follow up in the OB clinic in 4 weeks with       Delivery:    Episiotomy: None   Lacerations: 1st   Repair suture:     Repair # of packets: 1   Blood loss (ml):       Birth  information:  YOB: 2018   Time of birth: 10:44 AM   Sex: female   Delivery type: Vaginal, Spontaneous   Gestational Age: 39w2d    Delivery Clinician:      Other providers:       Additional  information:  Forceps:    Vacuum:    Breech:    Observed anomalies      Living?:           APGARS  One minute Five minutes Ten minutes   Skin color:         Heart rate:         Grimace:         Muscle tone:         Breathing:         Totals: 9  9        Placenta: Delivered:       appearance      Patient Instructions:   Current Discharge Medication List      START taking these medications    Details   naproxen (NAPROSYN) 500 MG tablet Take 1 tablet (500 mg total) by mouth every 8 (eight) hours as needed (Cramping).  Qty: 20 tablet, Refills: 0         CONTINUE these medications which have NOT CHANGED    Details   aspirin (ECOTRIN) 81 MG EC tablet Take 1 tablet (81 mg total) by mouth once daily.  Qty: 150 tablet, Refills: 2      prenatal vit,emilee 74-iron-folic 27 mg iron- 1 mg Tab Take 1 tablet by mouth once daily.  Qty: 90 tablet, Refills: 3             Patient is Discharged  home today   General recommendations and alarm signs are given  Pelvic rest   Follow up with Dr Jama  in  4   weeks   Rx sent electronically  To pharmacy on file   All questions answered       Ismael Jama M.D.   OB/GYN  10/9/2020

## 2020-10-12 ENCOUNTER — TELEPHONE (OUTPATIENT)
Dept: OBSTETRICS AND GYNECOLOGY | Facility: CLINIC | Age: 30
End: 2020-10-12

## 2020-10-12 NOTE — TELEPHONE ENCOUNTER
----- Message from Kisha Vogel sent at 10/12/2020 11:06 AM CDT -----  Contact: [phone]  Type:  Needs Medical Advice    Who Called: PT  Reason for Call; pt would like to speak with the nurse regarding scheduling for her Procedure  Would the patient rather a call back or a response via [facility]?  Call back  Best Call Back Number: [phone]  Additional Information:  Pt will need a

## 2020-10-14 ENCOUNTER — TELEPHONE (OUTPATIENT)
Dept: OBSTETRICS AND GYNECOLOGY | Facility: CLINIC | Age: 30
End: 2020-10-14

## 2020-10-14 NOTE — TELEPHONE ENCOUNTER
----- Message from Ismael Jama MD sent at 10/9/2020  9:41 AM CDT -----  Vamshi Keith.     This lady needs to be scheduled x surgery               Diagnosis-    Undesired fertility                                                 Procedure -        Laparoscopic tubal ligation/ salpingectomy                                             Assistant=  Meg   Equipment =       Thanks     Ismael Jama M.D.   OB/GYN

## 2020-10-19 ENCOUNTER — TELEPHONE (OUTPATIENT)
Dept: OBSTETRICS AND GYNECOLOGY | Facility: CLINIC | Age: 30
End: 2020-10-19

## 2020-10-19 DIAGNOSIS — Z30.014 ENCOUNTER FOR INITIAL PRESCRIPTION OF INTRAUTERINE CONTRACEPTIVE DEVICE (IUD): Primary | ICD-10-CM

## 2020-10-28 ENCOUNTER — TELEPHONE (OUTPATIENT)
Dept: OBSTETRICS AND GYNECOLOGY | Facility: CLINIC | Age: 30
End: 2020-10-28

## 2020-10-28 NOTE — TELEPHONE ENCOUNTER
October 26, 2020     The  [id number] - DEVICE AUTHORIZATIONS     - HI LEVONORGESTREL-RELEASING IUD (MIRENA 5YR) 52 MG    60720 (CPT®) - HI INSERT INTRAUTERINE DEVICE order placed by Ismael Jama for Saadia Gayle is now AUTHORIZED by MEDICAID for 1 visit(s). Please schedule the patient before the authorization expiration date, 12/31/2020, and confirm the authorized referral (ID# [id number]) is linked to the visit.     For questions, send an In Basket message to the Pre Service Intake pool or call the [facility] Pre-Service department at [phone]. Pre-Service department hours are M-F 8am to 5pm.        Information for Referral # [id number]:        Diagnoses:    Z30.014 (ICD-10-CM) - Encounter for initial prescription of intrauterine contraceptive device (IUD)             Procedures:    DEVICE AUTHORIZATIONS [LPL907]    HI LEVONORGESTREL-RELEASING IUD (MIRENA 5YR) 52 MG []    HI INSERT INTRAUTERINE DEVICE [08251 (CPT®)]     Device:    Mirena -

## 2021-09-13 PROBLEM — Z3A.39 39 WEEKS GESTATION OF PREGNANCY: Status: RESOLVED | Noted: 2020-10-07 | Resolved: 2021-09-13

## 2021-12-28 ENCOUNTER — HOSPITAL ENCOUNTER (EMERGENCY)
Facility: HOSPITAL | Age: 31
Discharge: HOME | End: 2021-12-28
Attending: EMERGENCY MEDICINE
Payer: OTHER GOVERNMENT

## 2021-12-28 VITALS
TEMPERATURE: 100 F | WEIGHT: 148 LBS | DIASTOLIC BLOOD PRESSURE: 90 MMHG | RESPIRATION RATE: 16 BRPM | SYSTOLIC BLOOD PRESSURE: 133 MMHG | HEART RATE: 108 BPM | BODY MASS INDEX: 27.94 KG/M2 | HEIGHT: 61 IN | OXYGEN SATURATION: 99 %

## 2021-12-28 DIAGNOSIS — U07.1 COVID-19 VIRUS INFECTION: Primary | ICD-10-CM

## 2021-12-28 LAB
CTP QC/QA: YES
SARS-COV-2 RDRP RESP QL NAA+PROBE: POSITIVE

## 2021-12-28 PROCEDURE — U0002 COVID-19 LAB TEST NON-CDC: HCPCS | Performed by: EMERGENCY MEDICINE

## 2021-12-28 PROCEDURE — 99282 EMERGENCY DEPT VISIT SF MDM: CPT

## 2021-12-28 RX ORDER — ACETAMINOPHEN 325 MG/1
650 TABLET ORAL
Status: DISCONTINUED | OUTPATIENT
Start: 2021-12-28 | End: 2021-12-29 | Stop reason: HOSPADM

## 2021-12-29 NOTE — ED PROVIDER NOTES
Encounter Date: 12/28/2021       History     Chief Complaint   Patient presents with    COVID-19 Concerns     Patient reports having fever, body aches, cough, congestion x 2 days.      Patient is a 31-year-old female presents ED with complaint of fever, body aches, cough congestion for the past 2 days.  Patient reports frontal headache but denies any neck pain neck stiffness.  Patient denies any known sick contacts.  Patient requesting COVID testing.        Review of patient's allergies indicates:   Allergen Reactions    Penicillins Hives and Shortness Of Breath    Amoxicillin      Past Medical History:   Diagnosis Date    Hypertension in pregnancy 2014     Past Surgical History:   Procedure Laterality Date    DILATION AND CURETTAGE OF UTERUS  2017    SAB     Family History   Problem Relation Age of Onset    Hypertension Father 53    Breast cancer Neg Hx     Colon cancer Neg Hx     Ovarian cancer Neg Hx      Social History     Tobacco Use    Smoking status: Never Smoker    Smokeless tobacco: Never Used   Substance Use Topics    Alcohol use: No    Drug use: No     Review of Systems   Constitutional: Positive for fever. Negative for chills.   HENT: Positive for congestion.    Respiratory: Positive for cough. Negative for shortness of breath.    Gastrointestinal: Negative for abdominal pain, nausea and vomiting.   Musculoskeletal: Positive for myalgias.   Neurological: Positive for headaches. Negative for dizziness and facial asymmetry.       Physical Exam     Initial Vitals [12/28/21 2241]   BP Pulse Resp Temp SpO2   (!) 133/90 108 16 99.6 °F (37.6 °C) 99 %      MAP       --         Physical Exam    Nursing note and vitals reviewed.  Constitutional: She appears well-developed and well-nourished.   HENT:   Head: Normocephalic and atraumatic.   Right Ear: External ear normal.   Left Ear: External ear normal.   Mouth/Throat: Oropharynx is clear and moist.   Neck: Neck supple.   Normal ROM    Normal range of  motion.  Pulmonary/Chest: No respiratory distress.   Abdominal: She exhibits no distension.   Musculoskeletal:         General: Normal range of motion.      Cervical back: Normal range of motion and neck supple.     Neurological: She is alert and oriented to person, place, and time.         ED Course   Procedures  Labs Reviewed   SARS-COV-2 RDRP GENE - Abnormal; Notable for the following components:       Result Value    POC Rapid COVID Positive (*)     All other components within normal limits          Imaging Results    None          Medications - No data to display  Medical Decision Making:   Clinical Tests:   Lab Tests: Ordered and Reviewed  ED Management:  - VSS; pt afebrile; NAD  - COVID 19 SWAB POSITIVE  - will recommend symptomatic care; will enroll patient and COVID home monitoring program; patient instructed to isolate per CDC guidelines  - pt comfortable with plan for discharge  - No further intervention is indicated at this time after having taken into account the patient's history, physical exam findings, and empirical and objective data obtained during the patient's emergency department workup.   - The patient is at low risk for an emergent medical condition at this time, and I am of the belief that that it is safe to discharge the patient from the emergency department.   - The patient is instructed to follow up as outpatient as indicated on the discharge paperwork.    - I have discussed the specifics of the workup with the patient and the patient has verbalized understanding of the details of the workup, the diagnosis, the treatment plan, and the need for outpatient follow-up.    - Although the patient has no emergent etiology today this does not preclude the development of an emergent condition so, in addition, I have advised the patient that they can return to the ED and/or activate EMS at any time with worsening of their symptoms, change of their symptoms, or with any other medical complaint.    -  The patient remained comfortable and stable during their visit in the ED.    - Discharge and follow-up instructions discussed with the patient who expressed understanding and willingness to comply with my recommendations.  - Results of all emergency department tests  discussed thoroughly with patient; all patient questions answered; pt in agreement with plan  - Pt instructed to follow up with PCP in 2-3 days for recheck of today's complaints  - Pt given strict emergency department return precautions for any new or worsening of symptoms  - Pt discharged from the emergency department in stable condition, in no acute distress                        Clinical Impression:   Final diagnoses:  [U07.1] COVID-19 virus infection (Primary)          ED Disposition Condition    Discharge Stable        ED Prescriptions     None        Follow-up Information    None          Andrea Gonzalez MD  12/29/21 0259

## 2023-01-30 NOTE — TELEPHONE ENCOUNTER
Patient states that she will not be able to make the appointment today and wants to reschedule. Patient is rescheduled for Friday.   subarachnoid hemorrhage/Acute Respiratory Failure